# Patient Record
Sex: MALE | Race: WHITE | NOT HISPANIC OR LATINO | Employment: FULL TIME | URBAN - METROPOLITAN AREA
[De-identification: names, ages, dates, MRNs, and addresses within clinical notes are randomized per-mention and may not be internally consistent; named-entity substitution may affect disease eponyms.]

---

## 2017-04-21 ENCOUNTER — TRANSCRIBE ORDERS (OUTPATIENT)
Dept: ADMINISTRATIVE | Facility: HOSPITAL | Age: 52
End: 2017-04-21

## 2017-04-21 ENCOUNTER — OFFICE VISIT (OUTPATIENT)
Dept: CARDIOLOGY | Facility: CLINIC | Age: 52
End: 2017-04-21

## 2017-04-21 ENCOUNTER — LAB (OUTPATIENT)
Dept: LAB | Facility: HOSPITAL | Age: 52
End: 2017-04-21
Attending: INTERNAL MEDICINE

## 2017-04-21 VITALS
WEIGHT: 310 LBS | HEART RATE: 70 BPM | HEIGHT: 66 IN | BODY MASS INDEX: 49.82 KG/M2 | DIASTOLIC BLOOD PRESSURE: 80 MMHG | SYSTOLIC BLOOD PRESSURE: 130 MMHG

## 2017-04-21 DIAGNOSIS — Z01.810 PRE-OPERATIVE CARDIOVASCULAR EXAMINATION: Primary | ICD-10-CM

## 2017-04-21 DIAGNOSIS — I25.10 CORONARY ARTERY DISEASE INVOLVING NATIVE CORONARY ARTERY OF NATIVE HEART WITHOUT ANGINA PECTORIS: Primary | ICD-10-CM

## 2017-04-21 DIAGNOSIS — Z01.810 PRE-OPERATIVE CARDIOVASCULAR EXAMINATION: ICD-10-CM

## 2017-04-21 DIAGNOSIS — Z13.6 SCREENING FOR ISCHEMIC HEART DISEASE: ICD-10-CM

## 2017-04-21 DIAGNOSIS — E11.59 TYPE 2 DIABETES MELLITUS WITH OTHER CIRCULATORY COMPLICATION: ICD-10-CM

## 2017-04-21 LAB
ANION GAP SERPL CALCULATED.3IONS-SCNC: 13.8 MMOL/L
BASOPHILS # BLD AUTO: 0.03 10*3/MM3 (ref 0–0.2)
BASOPHILS NFR BLD AUTO: 0.4 % (ref 0–1.5)
BUN BLD-MCNC: 10 MG/DL (ref 6–20)
BUN/CREAT SERPL: 12.3 (ref 7–25)
CALCIUM SPEC-SCNC: 9 MG/DL (ref 8.6–10.5)
CHLORIDE SERPL-SCNC: 98 MMOL/L (ref 98–107)
CO2 SERPL-SCNC: 23.2 MMOL/L (ref 22–29)
CREAT BLD-MCNC: 0.81 MG/DL (ref 0.76–1.27)
DEPRECATED RDW RBC AUTO: 41.9 FL (ref 37–54)
EOSINOPHIL # BLD AUTO: 0.3 10*3/MM3 (ref 0–0.7)
EOSINOPHIL NFR BLD AUTO: 4.5 % (ref 0.3–6.2)
ERYTHROCYTE [DISTWIDTH] IN BLOOD BY AUTOMATED COUNT: 13.5 % (ref 11.5–14.5)
GFR SERPL CREATININE-BSD FRML MDRD: 100 ML/MIN/1.73
GLUCOSE BLD-MCNC: 247 MG/DL (ref 65–99)
HCT VFR BLD AUTO: 44.6 % (ref 40.4–52.2)
HGB BLD-MCNC: 14.9 G/DL (ref 13.7–17.6)
IMM GRANULOCYTES # BLD: 0 10*3/MM3 (ref 0–0.03)
IMM GRANULOCYTES NFR BLD: 0 % (ref 0–0.5)
LYMPHOCYTES # BLD AUTO: 1.65 10*3/MM3 (ref 0.9–4.8)
LYMPHOCYTES NFR BLD AUTO: 24.7 % (ref 19.6–45.3)
MCH RBC QN AUTO: 28.5 PG (ref 27–32.7)
MCHC RBC AUTO-ENTMCNC: 33.4 G/DL (ref 32.6–36.4)
MCV RBC AUTO: 85.3 FL (ref 79.8–96.2)
MONOCYTES # BLD AUTO: 0.53 10*3/MM3 (ref 0.2–1.2)
MONOCYTES NFR BLD AUTO: 7.9 % (ref 5–12)
NEUTROPHILS # BLD AUTO: 4.16 10*3/MM3 (ref 1.9–8.1)
NEUTROPHILS NFR BLD AUTO: 62.5 % (ref 42.7–76)
PLATELET # BLD AUTO: 219 10*3/MM3 (ref 140–500)
PMV BLD AUTO: 10.9 FL (ref 6–12)
POTASSIUM BLD-SCNC: 3.9 MMOL/L (ref 3.5–5.2)
RBC # BLD AUTO: 5.23 10*6/MM3 (ref 4.6–6)
SODIUM BLD-SCNC: 135 MMOL/L (ref 136–145)
WBC NRBC COR # BLD: 6.67 10*3/MM3 (ref 4.5–10.7)

## 2017-04-21 PROCEDURE — 93000 ELECTROCARDIOGRAM COMPLETE: CPT | Performed by: INTERNAL MEDICINE

## 2017-04-21 PROCEDURE — 99214 OFFICE O/P EST MOD 30 MIN: CPT | Performed by: INTERNAL MEDICINE

## 2017-04-21 PROCEDURE — 36415 COLL VENOUS BLD VENIPUNCTURE: CPT

## 2017-04-21 PROCEDURE — 85025 COMPLETE CBC W/AUTO DIFF WBC: CPT

## 2017-04-21 PROCEDURE — 80048 BASIC METABOLIC PNL TOTAL CA: CPT

## 2017-04-21 RX ORDER — NITROGLYCERIN 0.4 MG/1
TABLET SUBLINGUAL
Qty: 50 TABLET | Refills: 3 | Status: SHIPPED | OUTPATIENT
Start: 2017-04-21 | End: 2017-04-26

## 2017-04-21 RX ORDER — DICLOFENAC SODIUM 75 MG/1
75 TABLET, DELAYED RELEASE ORAL 2 TIMES DAILY
Refills: 0 | COMMUNITY
Start: 2017-03-06 | End: 2020-10-23

## 2017-04-21 NOTE — PROGRESS NOTES
Date of Office Visit: 2017  Encounter Provider: Andrea Martell MD  Place of Service: [facility] CARDIOLOGY  Patient Name: David Henry  :1965    Chief complaint: Coronary artery disease.  Unstable angina.     History of Present Illness:    Dear Dr. Malone:     I again had the pleasure of seeing your patient in the cardiology office on 2017. As  you well know, he is a very pleasant, 52 year-old white male with a past medical history  significant for coronary artery disease and diabetes who presents for follow-up. The  patient first presented to me on 2012, with complaints of left-sided chest  discomfort with radiation to the left neck. He also had been having occasional pain down  his left arm and left arm numbness. He subsequently underwent a left heart  catheterization on 2012, by Dr. Rodriguez. This showed an 80% stenosis of the ostium  of a moderate to large diagonal branch. The branch was at an odd angle, and there was  fear that the stent would compromise flow in the left anterior descending. Therefore, he  was placed on medication at that time.      The patient continued to have significant chest discomfort and ultimately wanted to pursue  percutaneous intervention. He was informed that he would likely need a bifurcation stent  placed to the left anterior descending as well as the diagonal branch. He was willing to  accept these risks and, subsequently, underwent bifurcation stenting of the diagonal and  mid left anterior descending with two PROMUS Element drug-eluting stents on 2013,   by Dr. Blayne Stewart. He did well in the immediate post-procedure period. However, he then  developed recurrent chest discomfort in 10/2013. A follow-up catheterization at that time  showed an 80% stenosis at the ostium of the second diagonal branch. He then underwent  percutaneous intervention with a 3 mm x 10 mm Flextome cutting balloon. This reduced  the  lesion to less than 10%.     The patient presents today for follow-up.  Unfortunately, he has been having several   months worth of chest discomfort.  He describes this as a pressure sensation over his   left precordium which has occurred both with and without exertion.  He does feel that this   is getting worse.  In fact, he had an episode last week which lasted 30-40 minutes before   going away.  He has felt like he has had to stop and rest because of the discomfort   recently.      Past Medical History:   Diagnosis Date   • Allergic reaction to contrast material     IVP dye allergy   • Coronary artery disease    • Diabetes mellitus    • Difficulty breathing     Secondary to Brilinta in the past.   • Hypertension    • Obesity    • TAMMY (obstructive sleep apnea)        Past Surgical History:   Procedure Laterality Date   • APPENDECTOMY         Current Outpatient Prescriptions on File Prior to Visit   Medication Sig Dispense Refill   • aspirin 81 MG tablet Take 81 mg by mouth daily.     • atorvastatin (LIPITOR) 40 MG tablet Take 40 mg by mouth daily.     • Canagliflozin 100 MG tablet Take 1 tablet by mouth daily.     • glimepiride (AMARYL) 4 MG tablet Take 1 tablet by mouth 2 (two) times a day.     • losartan (COZAAR) 50 MG tablet Take 1 tablet (50 mg total) by mouth daily. 30 tablet 6   • sitaGLIPtin (JANUVIA) 100 MG tablet Take 1 tablet by mouth daily.     • [DISCONTINUED] nitroglycerin (NITROSTAT) 0.4 MG SL tablet Place under the tongue.       No current facility-administered medications on file prior to visit.      Allergies as of 04/21/2017 - Max as Reviewed 04/21/2017   Allergen Reaction Noted   • Ticagrelor Shortness Of Breath 02/03/2016   • Iodine  02/03/2016     Social History     Social History   • Marital status: Single     Spouse name: N/A   • Number of children: N/A   • Years of education: N/A     Occupational History   • Not on file.     Social History Main Topics   • Smoking status: Never Smoker  "  • Smokeless tobacco: Never Used   • Alcohol use Yes      Comment: Socially    • Drug use: No   • Sexual activity: Not on file     Other Topics Concern   • Not on file     Social History Narrative     Family History   Problem Relation Age of Onset   • Coronary artery disease Mother      Stenting in her late 60's   • Coronary artery disease Father      Father with fatal MI at age 50.   • Stroke Brother      Brother with CVA in his 40's       Review of Systems   HENT: Positive for headaches.    Cardiovascular: Positive for chest pain.   Neurological: Positive for paresthesias.   All other systems reviewed and are negative.    Objective:     Vitals:    04/21/17 1339   BP: 130/80   Pulse: 70   Weight: (!) 310 lb (141 kg)   Height: 66\" (167.6 cm)     Body mass index is 50.04 kg/(m^2).    Physical Exam   Constitutional: He is oriented to person, place, and time. He appears well-developed and well-nourished.   HENT:   Head: Normocephalic and atraumatic.   Eyes: Conjunctivae are normal.   Neck: Neck supple.   Cardiovascular: Normal rate and regular rhythm.  Exam reveals no gallop and no friction rub.    No murmur heard.  Pulmonary/Chest: Effort normal and breath sounds normal.   Abdominal: Soft. There is no tenderness.   Musculoskeletal: He exhibits no edema.   Neurological: He is alert and oriented to person, place, and time.   Skin: Skin is warm.   Psychiatric: He has a normal mood and affect. His behavior is normal.     Lab Review:     ECG 12 Lead  Date/Time: 4/21/2017 2:59 PM  Performed by: CHEIKH DENG  Authorized by: CHEIKH DENG   Comparison: compared with previous ECG from 2/5/2016  Rhythm: sinus rhythm  Rate: normal  BPM: 70  Other findings: PRWP  Clinical impression: abnormal ECG          Cardiac Procedures:  1. Status post placement of a 3.0 x 12 mm PROMUS drug-eluting stent to the second   diagonal branch, as well as placement of a 4.0 x 16 mm PROMUS drug-eluting stent   to the mid LAD by Dr. Cisneros " Terry on 01/21/2013.   2. Status post cutting balloon angioplasty to the ostium of the second diagonal branch   for an in-stent restenosis in 10/2013.   3. Normal ejection fraction of 60-65% by echocardiogram on 12/11/2012.   4. Exercise Myoview stress test on 02/09/2015: Normal with no ischemia or infarct.        Assessment:       Diagnosis Plan   1. Coronary artery disease involving native coronary artery of native heart without angina pectoris  Case Request Cath Lab: Coronary angiography, Left heart cath, Left ventriculography   2. Type 2 diabetes mellitus with other circulatory complication       Plan:       The patient has been having worsening chest discomfort as described above.  He is diabetic,   and has known coronary artery disease.  I'm concerned that he may be having unstable   angina at this point.  I have recommended a diagnostic left heart catheterization at this time.    I did discuss this in detail with the patient, and he agrees to the plan.  He was given samples   of Ranexa to start taking prior to the catheterization.  He will also continue on aspirin.  He is   not on a beta blocker secondary to relative bradycardia in the past.  He does have an IVP   dye allergy, and will be given premedications for this.  I also advised him to call 911 if he   had severe symptoms and to go to the hospital immediately.  I did also refill his nitroglycerin   Today.    Coronary Artery Disease  Assessment  • The patient has CCS class III - angina with activities of daily living  • The patient is having symptoms consistent with unstable angina     Plan  • The patient is being referred to interventional cardiology  • Lifestyle modifications discussed include adhering to a heart healthy diet, avoidance of tobacco products, maintenance of a healthy weight, medication compliance, regular exercise and regular monitoring of cholesterol and blood pressure    Subjective - Objective  • There has been a previous stent  procedure using ROYER on or around 1/21/2013  • Current antiplatelet therapy includes aspirin 81 mg

## 2017-04-26 RX ORDER — LOSARTAN POTASSIUM 50 MG/1
50 TABLET ORAL DAILY
COMMUNITY
End: 2020-10-23

## 2017-04-26 RX ORDER — NITROGLYCERIN 0.4 MG/1
0.4 TABLET SUBLINGUAL
COMMUNITY

## 2017-04-27 ENCOUNTER — HOSPITAL ENCOUNTER (OUTPATIENT)
Facility: HOSPITAL | Age: 52
Setting detail: HOSPITAL OUTPATIENT SURGERY
Discharge: HOME | End: 2017-04-27
Attending: INTERNAL MEDICINE | Admitting: INTERNAL MEDICINE

## 2017-04-27 VITALS
RESPIRATION RATE: 18 BRPM | SYSTOLIC BLOOD PRESSURE: 154 MMHG | BODY MASS INDEX: 48.97 KG/M2 | OXYGEN SATURATION: 94 % | TEMPERATURE: 99.4 F | DIASTOLIC BLOOD PRESSURE: 82 MMHG | HEART RATE: 77 BPM | HEIGHT: 67 IN | WEIGHT: 312 LBS

## 2017-04-27 DIAGNOSIS — I25.10 CORONARY ARTERY DISEASE INVOLVING NATIVE CORONARY ARTERY OF NATIVE HEART WITHOUT ANGINA PECTORIS: ICD-10-CM

## 2017-04-27 LAB — GLUCOSE BLDC GLUCOMTR-MCNC: 260 MG/DL (ref 70–130)

## 2017-04-27 PROCEDURE — 0 IOPAMIDOL PER 1 ML: Performed by: INTERNAL MEDICINE

## 2017-04-27 PROCEDURE — C1894 INTRO/SHEATH, NON-LASER: HCPCS | Performed by: INTERNAL MEDICINE

## 2017-04-27 PROCEDURE — C1769 GUIDE WIRE: HCPCS | Performed by: INTERNAL MEDICINE

## 2017-04-27 PROCEDURE — 93458 L HRT ARTERY/VENTRICLE ANGIO: CPT | Performed by: INTERNAL MEDICINE

## 2017-04-27 PROCEDURE — 25010000002 FENTANYL CITRATE (PF) 100 MCG/2ML SOLUTION: Performed by: INTERNAL MEDICINE

## 2017-04-27 PROCEDURE — 25010000002 HEPARIN (PORCINE) PER 1000 UNITS: Performed by: INTERNAL MEDICINE

## 2017-04-27 PROCEDURE — 82962 GLUCOSE BLOOD TEST: CPT

## 2017-04-27 PROCEDURE — 25010000002 MIDAZOLAM PER 1 MG: Performed by: INTERNAL MEDICINE

## 2017-04-27 PROCEDURE — C1894 INTRO/SHEATH, NON-LASER: HCPCS

## 2017-04-27 RX ORDER — SODIUM CHLORIDE 9 MG/ML
100 INJECTION, SOLUTION INTRAVENOUS CONTINUOUS
Status: DISCONTINUED | OUTPATIENT
Start: 2017-04-27 | End: 2017-04-27 | Stop reason: HOSPADM

## 2017-04-27 RX ORDER — MIDAZOLAM HYDROCHLORIDE 1 MG/ML
INJECTION INTRAMUSCULAR; INTRAVENOUS AS NEEDED
Status: DISCONTINUED | OUTPATIENT
Start: 2017-04-27 | End: 2017-04-27 | Stop reason: HOSPADM

## 2017-04-27 RX ORDER — RANOLAZINE 500 MG/1
500 TABLET, EXTENDED RELEASE ORAL 2 TIMES DAILY
COMMUNITY
End: 2020-10-23

## 2017-04-27 RX ORDER — SODIUM CHLORIDE 0.9 % (FLUSH) 0.9 %
1-10 SYRINGE (ML) INJECTION AS NEEDED
Status: DISCONTINUED | OUTPATIENT
Start: 2017-04-27 | End: 2017-04-27 | Stop reason: HOSPADM

## 2017-04-27 RX ORDER — LIDOCAINE HYDROCHLORIDE 10 MG/ML
0.1 INJECTION, SOLUTION EPIDURAL; INFILTRATION; INTRACAUDAL; PERINEURAL ONCE AS NEEDED
Status: DISCONTINUED | OUTPATIENT
Start: 2017-04-27 | End: 2017-04-27 | Stop reason: HOSPADM

## 2017-04-27 RX ORDER — SODIUM CHLORIDE 9 MG/ML
125 INJECTION, SOLUTION INTRAVENOUS CONTINUOUS
Status: DISCONTINUED | OUTPATIENT
Start: 2017-04-27 | End: 2017-04-27 | Stop reason: HOSPADM

## 2017-04-27 RX ORDER — LIDOCAINE HYDROCHLORIDE 20 MG/ML
INJECTION, SOLUTION INFILTRATION; PERINEURAL AS NEEDED
Status: DISCONTINUED | OUTPATIENT
Start: 2017-04-27 | End: 2017-04-27 | Stop reason: HOSPADM

## 2017-04-27 RX ORDER — TRAMADOL HYDROCHLORIDE 50 MG/1
50 TABLET ORAL EVERY 6 HOURS PRN
COMMUNITY
End: 2018-06-18

## 2017-04-27 RX ORDER — FENTANYL CITRATE 50 UG/ML
INJECTION, SOLUTION INTRAMUSCULAR; INTRAVENOUS AS NEEDED
Status: DISCONTINUED | OUTPATIENT
Start: 2017-04-27 | End: 2017-04-27 | Stop reason: HOSPADM

## 2017-04-27 RX ADMIN — SODIUM CHLORIDE 125 ML/HR: 9 INJECTION, SOLUTION INTRAVENOUS at 09:45

## 2017-05-08 ENCOUNTER — TELEPHONE (OUTPATIENT)
Dept: CARDIOLOGY | Facility: CLINIC | Age: 52
End: 2017-05-08

## 2017-05-19 ENCOUNTER — OFFICE VISIT (OUTPATIENT)
Dept: CARDIOLOGY | Facility: CLINIC | Age: 52
End: 2017-05-19

## 2017-05-19 VITALS
BODY MASS INDEX: 46.77 KG/M2 | HEART RATE: 84 BPM | WEIGHT: 298 LBS | HEIGHT: 67 IN | SYSTOLIC BLOOD PRESSURE: 142 MMHG | DIASTOLIC BLOOD PRESSURE: 88 MMHG

## 2017-05-19 DIAGNOSIS — R05.9 COUGH: ICD-10-CM

## 2017-05-19 DIAGNOSIS — R06.02 SHORTNESS OF BREATH: ICD-10-CM

## 2017-05-19 DIAGNOSIS — I25.10 CORONARY ARTERY DISEASE INVOLVING NATIVE CORONARY ARTERY OF NATIVE HEART WITHOUT ANGINA PECTORIS: ICD-10-CM

## 2017-05-19 DIAGNOSIS — R07.2 PRECORDIAL PAIN: Primary | ICD-10-CM

## 2017-05-19 PROCEDURE — 99214 OFFICE O/P EST MOD 30 MIN: CPT | Performed by: INTERNAL MEDICINE

## 2017-05-31 ENCOUNTER — HOSPITAL ENCOUNTER (OUTPATIENT)
Dept: CARDIOLOGY | Facility: HOSPITAL | Age: 52
Discharge: HOME | End: 2017-05-31
Attending: INTERNAL MEDICINE | Admitting: INTERNAL MEDICINE

## 2017-05-31 VITALS
HEART RATE: 64 BPM | BODY MASS INDEX: 46.77 KG/M2 | HEIGHT: 67 IN | OXYGEN SATURATION: 93 % | WEIGHT: 298 LBS | DIASTOLIC BLOOD PRESSURE: 82 MMHG | SYSTOLIC BLOOD PRESSURE: 124 MMHG

## 2017-05-31 DIAGNOSIS — R06.02 SHORTNESS OF BREATH: ICD-10-CM

## 2017-05-31 DIAGNOSIS — R05.9 COUGH: ICD-10-CM

## 2017-05-31 DIAGNOSIS — R07.2 PRECORDIAL PAIN: ICD-10-CM

## 2017-05-31 DIAGNOSIS — I25.10 CORONARY ARTERY DISEASE INVOLVING NATIVE CORONARY ARTERY OF NATIVE HEART WITHOUT ANGINA PECTORIS: ICD-10-CM

## 2017-05-31 LAB
BH CV ECHO MEAS - ACS: 2.1 CM
BH CV ECHO MEAS - AO MAX PG (FULL): 5.3 MMHG
BH CV ECHO MEAS - AO MAX PG: 8.5 MMHG
BH CV ECHO MEAS - AO MEAN PG (FULL): 2 MMHG
BH CV ECHO MEAS - AO MEAN PG: 4 MMHG
BH CV ECHO MEAS - AO ROOT AREA: 7.1 CM^2
BH CV ECHO MEAS - AO ROOT DIAM: 3 CM
BH CV ECHO MEAS - AO V2 MAX: 146 CM/SEC
BH CV ECHO MEAS - AO V2 MEAN: 98.9 CM/SEC
BH CV ECHO MEAS - AO V2 VTI: 30 CM
BH CV ECHO MEAS - AVA(I,A): 3.1 CM^2
BH CV ECHO MEAS - AVA(I,D): 3.1 CM^2
BH CV ECHO MEAS - AVA(V,A): 2.8 CM^2
BH CV ECHO MEAS - AVA(V,D): 2.8 CM^2
BH CV ECHO MEAS - CONTRAST EF (2CH): 60 ML/M^2
BH CV ECHO MEAS - CONTRAST EF 4CH: 72 ML/M^2
BH CV ECHO MEAS - EDV(MOD-SP2): 105 ML
BH CV ECHO MEAS - EDV(MOD-SP4): 118 ML
BH CV ECHO MEAS - EDV(TEICH): 162.6 ML
BH CV ECHO MEAS - EF(CUBED): 83 %
BH CV ECHO MEAS - EF(MOD-SP2): 60 %
BH CV ECHO MEAS - EF(MOD-SP4): 72 %
BH CV ECHO MEAS - EF(TEICH): 75.2 %
BH CV ECHO MEAS - ESV(MOD-SP2): 42 ML
BH CV ECHO MEAS - ESV(MOD-SP4): 33 ML
BH CV ECHO MEAS - ESV(TEICH): 40.3 ML
BH CV ECHO MEAS - FS: 44.6 %
BH CV ECHO MEAS - IVS/LVPW: 1.1
BH CV ECHO MEAS - IVSD: 1.2 CM
BH CV ECHO MEAS - LAT PEAK E' VEL: 9 CM/SEC
BH CV ECHO MEAS - LV MASS(C)D: 267.6 GRAMS
BH CV ECHO MEAS - LV MAX PG: 3.2 MMHG
BH CV ECHO MEAS - LV MEAN PG: 2 MMHG
BH CV ECHO MEAS - LV V1 MAX: 89.5 CM/SEC
BH CV ECHO MEAS - LV V1 MEAN: 63.7 CM/SEC
BH CV ECHO MEAS - LV V1 VTI: 20.4 CM
BH CV ECHO MEAS - LVIDD: 5.7 CM
BH CV ECHO MEAS - LVIDS: 3.2 CM
BH CV ECHO MEAS - LVLD AP2: 7.8 CM
BH CV ECHO MEAS - LVLD AP4: 8.2 CM
BH CV ECHO MEAS - LVLS AP2: 7.1 CM
BH CV ECHO MEAS - LVLS AP4: 6.9 CM
BH CV ECHO MEAS - LVOT AREA (M): 4.5 CM^2
BH CV ECHO MEAS - LVOT AREA: 4.5 CM^2
BH CV ECHO MEAS - LVOT DIAM: 2.4 CM
BH CV ECHO MEAS - LVPWD: 1.1 CM
BH CV ECHO MEAS - MED PEAK E' VEL: 8 CM/SEC
BH CV ECHO MEAS - MV A DUR: 0.14 SEC
BH CV ECHO MEAS - MV A MAX VEL: 82.4 CM/SEC
BH CV ECHO MEAS - MV DEC SLOPE: 520 CM/SEC^2
BH CV ECHO MEAS - MV DEC TIME: 0.19 SEC
BH CV ECHO MEAS - MV E MAX VEL: 87.4 CM/SEC
BH CV ECHO MEAS - MV E/A: 1.1
BH CV ECHO MEAS - MV MAX PG: 5 MMHG
BH CV ECHO MEAS - MV MEAN PG: 2 MMHG
BH CV ECHO MEAS - MV P1/2T MAX VEL: 88.4 CM/SEC
BH CV ECHO MEAS - MV P1/2T: 49.8 MSEC
BH CV ECHO MEAS - MV V2 MAX: 112 CM/SEC
BH CV ECHO MEAS - MV V2 MEAN: 58.6 CM/SEC
BH CV ECHO MEAS - MV V2 VTI: 28.4 CM
BH CV ECHO MEAS - MVA P1/2T LCG: 2.5 CM^2
BH CV ECHO MEAS - MVA(P1/2T): 4.4 CM^2
BH CV ECHO MEAS - MVA(VTI): 3.2 CM^2
BH CV ECHO MEAS - PA MAX PG (FULL): 2.2 MMHG
BH CV ECHO MEAS - PA MAX PG: 4.8 MMHG
BH CV ECHO MEAS - PA V2 MAX: 109 CM/SEC
BH CV ECHO MEAS - PULM A REVS DUR: 0.11 SEC
BH CV ECHO MEAS - PULM A REVS VEL: 25.1 CM/SEC
BH CV ECHO MEAS - PULM DIAS VEL: 42 CM/SEC
BH CV ECHO MEAS - PULM S/D: 1.1
BH CV ECHO MEAS - PULM SYS VEL: 46.8 CM/SEC
BH CV ECHO MEAS - PVA(V,A): 3.1 CM^2
BH CV ECHO MEAS - PVA(V,D): 3.1 CM^2
BH CV ECHO MEAS - QP/QS: 0.84
BH CV ECHO MEAS - RAP SYSTOLE: 3 MMHG
BH CV ECHO MEAS - RV MAX PG: 2.6 MMHG
BH CV ECHO MEAS - RV MEAN PG: 2 MMHG
BH CV ECHO MEAS - RV V1 MAX: 80.5 CM/SEC
BH CV ECHO MEAS - RV V1 MEAN: 58.8 CM/SEC
BH CV ECHO MEAS - RV V1 VTI: 18.6 CM
BH CV ECHO MEAS - RVOT AREA: 4.2 CM^2
BH CV ECHO MEAS - RVOT DIAM: 2.3 CM
BH CV ECHO MEAS - SUP REN AO DIAM: 2.2 CM
BH CV ECHO MEAS - SV(AO): 212.1 ML
BH CV ECHO MEAS - SV(CUBED): 157 ML
BH CV ECHO MEAS - SV(LVOT): 92.3 ML
BH CV ECHO MEAS - SV(MOD-SP2): 63 ML
BH CV ECHO MEAS - SV(MOD-SP4): 85 ML
BH CV ECHO MEAS - SV(RVOT): 77.3 ML
BH CV ECHO MEAS - SV(TEICH): 122.3 ML
BH CV XLRA - TDI S': 14 CM/SEC
E/E' RATIO: 10
LEFT ATRIUM VOLUME INDEX: 23 ML/M2
SINUS: 2.8 CM
STJ: 2.8 CM

## 2017-05-31 PROCEDURE — 93306 TTE W/DOPPLER COMPLETE: CPT | Performed by: INTERNAL MEDICINE

## 2017-05-31 PROCEDURE — 25010000002 PERFLUTREN (DEFINITY) 8.476 MG IN SODIUM CHLORIDE 10 ML INJECTION: Performed by: INTERNAL MEDICINE

## 2017-05-31 PROCEDURE — C8929 TTE W OR WO FOL WCON,DOPPLER: HCPCS

## 2017-05-31 RX ADMIN — PERFLUTREN 2 ML: 6.52 INJECTION, SUSPENSION INTRAVENOUS at 09:21

## 2018-06-18 ENCOUNTER — OFFICE VISIT (OUTPATIENT)
Dept: CARDIOLOGY | Facility: CLINIC | Age: 53
End: 2018-06-18

## 2018-06-18 VITALS
HEIGHT: 67 IN | SYSTOLIC BLOOD PRESSURE: 110 MMHG | DIASTOLIC BLOOD PRESSURE: 78 MMHG | HEART RATE: 75 BPM | BODY MASS INDEX: 45.92 KG/M2 | WEIGHT: 292.6 LBS | OXYGEN SATURATION: 95 %

## 2018-06-18 DIAGNOSIS — I25.10 CORONARY ARTERY DISEASE INVOLVING NATIVE CORONARY ARTERY OF NATIVE HEART WITHOUT ANGINA PECTORIS: ICD-10-CM

## 2018-06-18 DIAGNOSIS — R07.2 PRECORDIAL PAIN: Primary | ICD-10-CM

## 2018-06-18 PROCEDURE — 99214 OFFICE O/P EST MOD 30 MIN: CPT | Performed by: INTERNAL MEDICINE

## 2018-06-19 NOTE — PROGRESS NOTES
Date of Office Visit: 2018  Encounter Provider: Andrea Martell MD  Place of Service: [facility] CARDIOLOGY  Patient Name: David Henry  :1965    Chief complaint: Follow-up for coronary artery disease.  Recurrent chest pain.    History of Present Illness:    Dear Dr. Malone:    I again had the pleasure of seeing your patient in the cardiology office on 2018.  As you   well know, he is a very pleasant, 53 year-old white male with a past medical history significant   for coronary artery disease and diabetes who presents for follow-up. The patient first   presented to me on 2012, with complaints of left-sided chest discomfort with radiation   to the left neck. He also had been having occasional pain down his left arm and left arm   numbness. He subsequently underwent a left heart catheterization on 2012, by Dr. Rodriguez. This showed an 80% stenosis of the ostium of a moderate to large diagonal branch.   The branch was at an odd angle, and there was fear that the stent would compromise flow   in the left anterior descending. Therefore, he was placed on medication at that time.     The patient continued to have significant chest discomfort and ultimately wanted to pursue  percutaneous intervention. He was informed that he would likely need a bifurcation stent  placed to the left anterior descending as well as the diagonal branch. He was willing to  accept these risks and, subsequently, underwent bifurcation stenting of the diagonal and  mid left anterior descending with two PROMUS Element drug-eluting stents on 2013,   by Dr. Blayne Stewart. He did well in the immediate post-procedure period. However, he then  developed recurrent chest discomfort in 10/2013. A follow-up catheterization at that time  showed an 80% stenosis at the ostium of the second diagonal branch. He then underwent  percutaneous intervention with a 3 mm x 10 mm Flextome cutting  "balloon. This reduced the  lesion to less than 10%.    At the patient's follow-up visit on 4/21/2017, he was complaining of several months of   worsening chest discomfort.  He had described a pressure sensation over his left   precordium which seem to be lasting longer.  There was concern that he may be having   unstable angina.  He subsequently underwent a left heart catheterization on 4/27/2017 by   Dr. Welch.  This showed the complex bifurcation stent in the LAD and second diagonal   branch to be patent.  His left circumflex and right coronary arteries were normal.  He did   have a 40% ostial stenosis and a smaller first diagonal branch.    The patient presents today for follow-up.  Unfortunately, he has been having recurrent chest   discomfort.  He describes this as left sided pain which feels like a pressure sensation.  It is   focal over the left breast area, and he often feels as if he has to \"grab his chest\".  This is   similar to what he had last year, although it is occurring more frequently now.  He states   that it occurs randomly and last for several minutes at a time.  He does state that it is fairly   intense.    Past Medical History:   Diagnosis Date   • Allergic reaction to contrast material     IVP dye allergy   • Coronary artery disease    • Diabetes mellitus    • Difficulty breathing     Secondary to Brilinta in the past.   • Heart murmur     PER PT   • Hyperlipidemia    • Hypertension    • Obesity    • TAMMY (obstructive sleep apnea)        Past Surgical History:   Procedure Laterality Date   • APPENDECTOMY     • CARDIAC CATHETERIZATION N/A 4/27/2017    Procedure: Coronary angiography;  Surgeon: Reji Welhc MD;  Location: [facility] INVASIVE LOCATION;  Service:    • CARDIAC CATHETERIZATION N/A 4/27/2017    Procedure: Left heart cath;  Surgeon: Reji Welch MD;  Location: [facility] INVASIVE LOCATION;  Service:    • CARDIAC CATHETERIZATION N/A 4/27/2017    Procedure: Left " ventriculography;  Surgeon: Reji Welch MD;  Location: [facility] INVASIVE LOCATION;  Service:    • FACIAL RECONSTRUCTION SURGERY      FOREHEAD       Current Outpatient Prescriptions on File Prior to Visit   Medication Sig Dispense Refill   • aspirin 81 MG tablet Take 81 mg by mouth daily.     • atorvastatin (LIPITOR) 40 MG tablet Take 40 mg by mouth daily.     • diclofenac (VOLTAREN) 75 MG EC tablet Take 75 mg by mouth 2 (Two) Times a Day.  0   • glimepiride (AMARYL) 4 MG tablet Take 4 mg by mouth 2 (Two) Times a Day.     • losartan (COZAAR) 50 MG tablet Take 50 mg by mouth Daily.     • nitroglycerin (NITROSTAT) 0.4 MG SL tablet Place 0.4 mg under the tongue Every 5 (Five) Minutes As Needed for Chest Pain. Take no more than 3 doses in 15 minutes.     • sitaGLIPtin (JANUVIA) 100 MG tablet Take 100 mg by mouth Daily.     • ranolazine (RANEXA) 500 MG 12 hr tablet Take 500 mg by mouth 2 (Two) Times a Day.       No current facility-administered medications on file prior to visit.      Allergies as of 06/18/2018 - Reviewed 06/18/2018   Allergen Reaction Noted   • Ticagrelor Shortness Of Breath 02/03/2016   • Iodine  02/03/2016     Social History     Social History   • Marital status:      Spouse name: N/A   • Number of children: N/A   • Years of education: N/A     Occupational History   • Not on file.     Social History Main Topics   • Smoking status: Never Smoker   • Smokeless tobacco: Never Used   • Alcohol use Yes      Comment: Socially    • Drug use: No   • Sexual activity: Defer     Other Topics Concern   • Not on file     Social History Narrative   • No narrative on file     Family History   Problem Relation Age of Onset   • Coronary artery disease Mother         Stenting in her late 60's   • Coronary artery disease Father         Father with fatal MI at age 50.   • Stroke Brother         Brother with CVA in his 40's       Review of Systems   Constitution: Positive for malaise/fatigue.   Cardiovascular:  "Positive for chest pain and palpitations.   Neurological: Positive for light-headedness.   All other systems reviewed and are negative.     Objective:     Vitals:    06/18/18 1430   BP: 110/78   Pulse: 75   SpO2: 95%   Weight: 133 kg (292 lb 9.6 oz)   Height: 170.2 cm (67.01\")     Body mass index is 45.82 kg/m².    Physical Exam   Constitutional: He is oriented to person, place, and time. He appears well-developed and well-nourished.   HENT:   Head: Normocephalic and atraumatic.   Eyes: Conjunctivae are normal.   Neck: Neck supple.   Cardiovascular: Normal rate and regular rhythm.  Exam reveals no gallop and no friction rub.    No murmur heard.  Pulmonary/Chest: Effort normal and breath sounds normal.   Abdominal: Soft. There is no tenderness.   Musculoskeletal: He exhibits no edema.   Neurological: He is alert and oriented to person, place, and time.   Skin: Skin is warm.   Psychiatric: He has a normal mood and affect. His behavior is normal.     Lab Review:   Procedures    Cardiac Procedures:  1. Status post placement of a 3.0 x 12 mm PROMUS drug-eluting stent to the second   diagonal branch, as well as placement of a 4.0 x 16 mm PROMUS drug-eluting stent to   the mid LAD by Dr. Blayne Stewart on 01/21/2013.   2. Status post cutting balloon angioplasty to the ostium of the second diagonal branch for   an in-stent restenosis in 10/2013.   3. Normal ejection fraction of 60-65% by echocardiogram on 12/11/2012.   4. Exercise Myoview stress test on 02/09/2015: Normal with no ischemia or infarct.   5.  Left heart catheterization on 4/27/2017 by Dr. Welch: The left main was normal.  The   mid LAD had a complex bifurcation stent that was widely patent into the second   diagonal branch.  The first diagonal branch was a smaller vessel with a 40% ostial   stenosis.  The left circumflex was normal.  The right coronary artery was dominant and   normal.  6.  Echocardiogram on 5/31/2017: The ejection fraction was 60-65%.  There " was grade 2   diastolic dysfunction.  The right ventricle was mildly dilated with normal function.  Left   atrium was mildly dilated.  There was no significant valvular disease.    Assessment:       Diagnosis Plan   1. Precordial pain  CT Angiogram Chest With & Without Contrast   2. Coronary artery disease involving native coronary artery of native heart without angina pectoris       Plan:       Again, the patient is now having significant recurrent chest discomfort which he describes   a pressure sensation over his left precordial area.  This is similar to what he had in April 2017, but dissimilar from his previous angina. His previous angina was more like a stabbing   sensation in the center of his chest.  His catheterization on 4/27/2017 showed only a 40%   lesion in the ostial first diagonal branch.  His stents in the LAD and second diagonal branch   were widely patent.  He did have an echocardiogram last year's well which showed grade 2   diastolic dysfunction, but no other significant issues.  At this point, I would be more   concerned about other potential causes of chest discomfort such as an aortic aneurysm or   other aortic pathology.  I have recommended checking a CT angiogram of his chest to   assess his aorta further.  He does state that this is fairly intense discomfort when it occurs.    He will continue on the aspirin and Lipitor for the coronary artery disease.  His blood   pressure is controlled on the losartan.  He is not on a beta blocker secondary to bradycardia   in the past with these agents.  Further plans will be made pending the results of the CT   angiogram of the chest.      Coronary Artery Disease  Assessment  • The patient has no angina    Plan  • Lifestyle modifications discussed include adhering to a heart healthy diet, avoidance of tobacco products, maintenance of a healthy weight, medication compliance, regular exercise and regular monitoring of cholesterol and blood  pressure    Subjective - Objective  • There has been a previous stent procedure using ROYER on or around 1/21/2013  • Current antiplatelet therapy includes aspirin 81 mg

## 2020-10-23 ENCOUNTER — OFFICE VISIT (OUTPATIENT)
Dept: CARDIOLOGY | Facility: CLINIC | Age: 55
End: 2020-10-23

## 2020-10-23 VITALS
DIASTOLIC BLOOD PRESSURE: 82 MMHG | WEIGHT: 278 LBS | HEART RATE: 81 BPM | SYSTOLIC BLOOD PRESSURE: 114 MMHG | BODY MASS INDEX: 43.63 KG/M2 | HEIGHT: 67 IN

## 2020-10-23 DIAGNOSIS — R07.89 CHEST PRESSURE: Primary | ICD-10-CM

## 2020-10-23 DIAGNOSIS — I10 ESSENTIAL HYPERTENSION: ICD-10-CM

## 2020-10-23 DIAGNOSIS — I25.10 CORONARY ARTERY DISEASE INVOLVING NATIVE CORONARY ARTERY OF NATIVE HEART, ANGINA PRESENCE UNSPECIFIED: ICD-10-CM

## 2020-10-23 PROCEDURE — 93000 ELECTROCARDIOGRAM COMPLETE: CPT | Performed by: INTERNAL MEDICINE

## 2020-10-23 PROCEDURE — 99214 OFFICE O/P EST MOD 30 MIN: CPT | Performed by: INTERNAL MEDICINE

## 2020-10-23 RX ORDER — EMPAGLIFLOZIN 25 MG/1
TABLET, FILM COATED ORAL DAILY
COMMUNITY
Start: 2020-09-18 | End: 2022-12-14

## 2020-10-23 RX ORDER — LISINOPRIL 20 MG/1
20 TABLET ORAL DAILY
Qty: 30 TABLET | Refills: 11 | Status: SHIPPED | OUTPATIENT
Start: 2020-10-23

## 2020-10-23 RX ORDER — LISINOPRIL 10 MG/1
TABLET ORAL
COMMUNITY
Start: 2020-10-07 | End: 2020-10-23

## 2020-10-23 RX ORDER — ATORVASTATIN CALCIUM 40 MG/1
40 TABLET, FILM COATED ORAL DAILY
Qty: 30 TABLET | Refills: 11 | Status: SHIPPED | OUTPATIENT
Start: 2020-10-23

## 2020-10-28 ENCOUNTER — RESULTS ENCOUNTER (OUTPATIENT)
Dept: CARDIOLOGY | Facility: CLINIC | Age: 55
End: 2020-10-28

## 2020-10-28 DIAGNOSIS — I25.10 CORONARY ARTERY DISEASE INVOLVING NATIVE CORONARY ARTERY OF NATIVE HEART, ANGINA PRESENCE UNSPECIFIED: ICD-10-CM

## 2020-10-28 DIAGNOSIS — I10 ESSENTIAL HYPERTENSION: ICD-10-CM

## 2020-10-29 ENCOUNTER — LAB (OUTPATIENT)
Dept: LAB | Facility: HOSPITAL | Age: 55
End: 2020-10-29

## 2020-10-29 ENCOUNTER — HOSPITAL ENCOUNTER (OUTPATIENT)
Dept: CARDIOLOGY | Facility: HOSPITAL | Age: 55
Discharge: HOME | End: 2020-10-29

## 2020-10-29 VITALS — WEIGHT: 278 LBS | HEIGHT: 67 IN | BODY MASS INDEX: 43.63 KG/M2

## 2020-10-29 DIAGNOSIS — I25.10 CORONARY ARTERY DISEASE INVOLVING NATIVE CORONARY ARTERY OF NATIVE HEART, ANGINA PRESENCE UNSPECIFIED: ICD-10-CM

## 2020-10-29 DIAGNOSIS — R07.89 CHEST PRESSURE: ICD-10-CM

## 2020-10-29 LAB
ANION GAP SERPL CALCULATED.3IONS-SCNC: 9.4 MMOL/L (ref 5–15)
BH CV NUCLEAR PRIOR STUDY: 2
BH CV STRESS BP STAGE 1: NORMAL
BH CV STRESS COMMENTS STAGE 1: NORMAL
BH CV STRESS DOSE REGADENOSON STAGE 1: 0.4
BH CV STRESS DURATION MIN STAGE 1: 0
BH CV STRESS DURATION SEC STAGE 1: 10
BH CV STRESS HR STAGE 1: 94
BH CV STRESS PROTOCOL 1: NORMAL
BH CV STRESS RECOVERY BP: NORMAL MMHG
BH CV STRESS RECOVERY HR: 83 BPM
BH CV STRESS STAGE 1: 1
BUN SERPL-MCNC: 11 MG/DL (ref 6–20)
BUN/CREAT SERPL: 14.7 (ref 7–25)
CALCIUM SPEC-SCNC: 9.4 MG/DL (ref 8.6–10.5)
CHLORIDE SERPL-SCNC: 102 MMOL/L (ref 98–107)
CO2 SERPL-SCNC: 27.6 MMOL/L (ref 22–29)
CREAT SERPL-MCNC: 0.75 MG/DL (ref 0.76–1.27)
GFR SERPL CREATININE-BSD FRML MDRD: 109 ML/MIN/1.73
GLUCOSE SERPL-MCNC: 140 MG/DL (ref 65–99)
LV EF NUC BP: 58 %
MAXIMAL PREDICTED HEART RATE: 166 BPM
PERCENT MAX PREDICTED HR: 56.63 %
POTASSIUM SERPL-SCNC: 4.2 MMOL/L (ref 3.5–5.2)
SODIUM SERPL-SCNC: 139 MMOL/L (ref 136–145)
STRESS BASELINE BP: NORMAL MMHG
STRESS BASELINE HR: 75 BPM
STRESS PERCENT HR: 67 %
STRESS POST EXERCISE DUR SEC: 10 SEC
STRESS POST PEAK BP: NORMAL MMHG
STRESS POST PEAK HR: 94 BPM
STRESS TARGET HR: 141 BPM

## 2020-10-29 PROCEDURE — 93018 CV STRESS TEST I&R ONLY: CPT | Performed by: INTERNAL MEDICINE

## 2020-10-29 PROCEDURE — 78492 MYOCRD IMG PET MLT RST&STRS: CPT

## 2020-10-29 PROCEDURE — 80048 BASIC METABOLIC PNL TOTAL CA: CPT | Performed by: INTERNAL MEDICINE

## 2020-10-29 PROCEDURE — A9555 RB82 RUBIDIUM: HCPCS | Performed by: INTERNAL MEDICINE

## 2020-10-29 PROCEDURE — 93017 CV STRESS TEST TRACING ONLY: CPT

## 2020-10-29 PROCEDURE — 25010000002 REGADENOSON 0.4 MG/5ML SOLUTION: Performed by: INTERNAL MEDICINE

## 2020-10-29 PROCEDURE — 36415 COLL VENOUS BLD VENIPUNCTURE: CPT | Performed by: INTERNAL MEDICINE

## 2020-10-29 PROCEDURE — 93016 CV STRESS TEST SUPVJ ONLY: CPT | Performed by: INTERNAL MEDICINE

## 2020-10-29 PROCEDURE — 0 RUBIDIUM CHLORIDE: Performed by: INTERNAL MEDICINE

## 2020-10-29 PROCEDURE — 78492 MYOCRD IMG PET MLT RST&STRS: CPT | Performed by: INTERNAL MEDICINE

## 2020-10-29 RX ADMIN — REGADENOSON 0.4 MG: 0.08 INJECTION, SOLUTION INTRAVENOUS at 09:56

## 2020-11-02 NOTE — PROGRESS NOTES
Date of Office Visit: 10/23/2020  Encounter Provider: Andrea Martell MD  Place of Service: [facility] CARDIOLOGY  Patient Name: David Henry  :1965    Chief complaint: Follow-up for coronary artery disease, hypertension.    History of Present Illness:    Dear Dr. Malone:    I again had the pleasure of seeing your patient in the cardiology office on 10/23/2020.  As you   well know, he is a very pleasant, 55 year-old white male with a past medical history significant   for coronary artery disease and diabetes who presents for follow-up. The patient first   presented to me on 2012, with complaints of left-sided chest discomfort with radiation   to the left neck. He also had been having occasional pain down his left arm and left arm   numbness. He subsequently underwent a left heart catheterization on 2012, by Dr. Rodriguez. This showed an 80% stenosis of the ostium of a moderate to large diagonal branch.   The branch was at an odd angle, and there was fear that the stent would compromise flow   in the left anterior descending. Therefore, he was placed on medication at that time.     The patient continued to have significant chest discomfort and ultimately wanted to pursue  percutaneous intervention. He was informed that he would likely need a bifurcation stent  placed to the left anterior descending as well as the diagonal branch. He was willing to  accept these risks and, subsequently, underwent bifurcation stenting of the diagonal and  mid left anterior descending with two PROMUS Element drug-eluting stents on 2013,   by Dr. Blayne Stewart. He did well in the immediate post-procedure period. However, he then  developed recurrent chest discomfort in 10/2013. A follow-up catheterization at that time  showed an 80% stenosis at the ostium of the second diagonal branch. He then underwent  percutaneous intervention with a 3 mm x 10 mm Flextome cutting balloon. This  reduced the  lesion to less than 10%.    At the patient's follow-up visit on 2017, he was complaining of several months of   worsening chest discomfort.  He had described a pressure sensation over his left   precordium which seem to be lasting longer.  There was concern that he may be having   unstable angina.  He subsequently underwent a left heart catheterization on 2017 by   Dr. Welch.  This showed the complex bifurcation stent in the LAD and second diagonal   branch to be patent.  His left circumflex and right coronary arteries were normal.  He did   have a 40% ostial stenosis and a smaller first diagonal branch.    The patient presents today for follow-up.  He has had some recurrent chest discomfort which she describes as pressure over his left precordium.  It occurs randomly, and lasts for 30 minutes to 1 hour at a time.  His blood pressure is high today, and it has likely been running high.  He also has been off of his Lipitor for unclear reasons.  He thinks that it just  and he never renewed it.    Past Medical History:   Diagnosis Date   • Allergic reaction to contrast material     IVP dye allergy   • Coronary artery disease    • Diabetes mellitus (CMS/HCC)    • Difficulty breathing     Secondary to Brilinta in the past.   • Heart murmur     PER PT   • Hyperlipidemia    • Hypertension    • Obesity    • TAMMY (obstructive sleep apnea)        Past Surgical History:   Procedure Laterality Date   • APPENDECTOMY     • CARDIAC CATHETERIZATION N/A 2017    Procedure: Coronary angiography;  Surgeon: Reji Welch MD;  Location: [facility] INVASIVE LOCATION;  Service:    • CARDIAC CATHETERIZATION N/A 2017    Procedure: Left heart cath;  Surgeon: Reji Welch MD;  Location: [facility] CATH INVASIVE LOCATION;  Service:    • CARDIAC CATHETERIZATION N/A 2017    Procedure: Left ventriculography;  Surgeon: Reji Welch MD;  Location: [facility] CATH INVASIVE LOCATION;  Service:    • FACIAL  "RECONSTRUCTION SURGERY      FOREHEAD       Current Outpatient Medications on File Prior to Visit   Medication Sig Dispense Refill   • aspirin 81 MG tablet Take 81 mg by mouth daily.     • glimepiride (AMARYL) 4 MG tablet Take 4 mg by mouth 2 (Two) Times a Day.     • Jardiance 25 MG tablet Take  by mouth Daily.     • nitroglycerin (NITROSTAT) 0.4 MG SL tablet Place 0.4 mg under the tongue Every 5 (Five) Minutes As Needed for Chest Pain. Take no more than 3 doses in 15 minutes.     • sitaGLIPtin (JANUVIA) 100 MG tablet Take 100 mg by mouth Daily.       No current facility-administered medications on file prior to visit.      Allergies as of 10/23/2020 - Reviewed 06/19/2018   Allergen Reaction Noted   • Ticagrelor Shortness Of Breath 02/03/2016   • Iodine  02/03/2016     Social History     Socioeconomic History   • Marital status:      Spouse name: Not on file   • Number of children: Not on file   • Years of education: Not on file   • Highest education level: Not on file   Tobacco Use   • Smoking status: Never Smoker   • Smokeless tobacco: Never Used   Substance and Sexual Activity   • Alcohol use: Yes     Comment: Socially    • Drug use: No   • Sexual activity: Defer     Family History   Problem Relation Age of Onset   • Coronary artery disease Mother         Stenting in her late 60's   • Coronary artery disease Father         Father with fatal MI at age 50.   • Stroke Brother         Brother with CVA in his 40's       Review of Systems   Constitution: Positive for malaise/fatigue.   Cardiovascular: Positive for chest pain and dyspnea on exertion.   All other systems reviewed and are negative.     Objective:     Vitals:    10/23/20 1030   BP: 114/82   Pulse: 81   Weight: 126 kg (278 lb)   Height: 170.2 cm (67\")     Body mass index is 43.54 kg/m².    Physical Exam   Constitutional: He is oriented to person, place, and time. He appears well-developed and well-nourished.   HENT:   Head: Normocephalic and " atraumatic.   Eyes: Conjunctivae are normal.   Neck: Neck supple.   Cardiovascular: Normal rate and regular rhythm. Exam reveals no gallop and no friction rub.   No murmur heard.  Pulmonary/Chest: Effort normal and breath sounds normal.   Abdominal: Soft. There is no abdominal tenderness.   Musculoskeletal:         General: No edema.   Neurological: He is alert and oriented to person, place, and time.   Skin: Skin is warm.   Psychiatric: He has a normal mood and affect. His behavior is normal.     Lab Review:     ECG 12 Lead    Date/Time: 10/23/2020 9:36 AM  Performed by: Andrea Martell MD  Authorized by: Adnrea Martell MD   Comparison: compared with previous ECG from 4/21/2017  Similar to previous ECG  Rhythm: sinus rhythm  Rate: normal  BPM: 81  Other findings: poor R wave progression    Clinical impression: abnormal EKG            Cardiac Procedures:  1. Status post placement of a 3.0 x 12 mm PROMUS drug-eluting stent to the second   diagonal branch, as well as placement of a 4.0 x 16 mm PROMUS drug-eluting stent to   the mid LAD by Dr. Blayne Stewart on 01/21/2013.   2. Status post cutting balloon angioplasty to the ostium of the second diagonal branch for   an in-stent restenosis in 10/2013.   3. Normal ejection fraction of 60-65% by echocardiogram on 12/11/2012.   4. Exercise Myoview stress test on 02/09/2015: Normal with no ischemia or infarct.   5.  Left heart catheterization on 4/27/2017 by Dr. Welch: The left main was normal.  The   mid LAD had a complex bifurcation stent that was widely patent into the second   diagonal branch.  The first diagonal branch was a smaller vessel with a 40% ostial   stenosis.  The left circumflex was normal.  The right coronary artery was dominant and   normal.  6.  Echocardiogram on 5/31/2017: The ejection fraction was 60-65%.  There was grade 2   diastolic dysfunction.  The right ventricle was mildly dilated with normal function.  Left   atrium was mildly dilated.   There was no significant valvular disease.    Assessment:       Diagnosis Plan   1. Chest pressure  Stress Test With Pet Myocardial Perfusion (MULTI STUDY, REST AND STRESS)   2. Coronary artery disease involving native coronary artery of native heart, angina presence unspecified  Basic Metabolic Panel    Stress Test With Pet Myocardial Perfusion (MULTI STUDY, REST AND STRESS)   3. Essential hypertension  Basic Metabolic Panel     Plan:       Overall, there are some issues that need to be addressed today.  He has had some random chest discomfort as described above.  Given his history and his diabetes, I am going to check a Lexiscan PET stress test.  I also feel his blood pressure is running too high.  I am going to increase his lisinopril from 10 mg/day to 20 mg/day.  I will recheck a BMP in 2 weeks.  He needs to be back on his statin medication, and I restarted the Lipitor at 40 mg/day.  He will continue on the aspirin for life.  I will get him back on a 6-month schedule for follow-up.

## 2021-10-27 ENCOUNTER — OFFICE VISIT (OUTPATIENT)
Dept: CARDIOLOGY | Facility: CLINIC | Age: 56
End: 2021-10-27

## 2021-10-27 VITALS
SYSTOLIC BLOOD PRESSURE: 150 MMHG | BODY MASS INDEX: 46.83 KG/M2 | OXYGEN SATURATION: 98 % | HEIGHT: 67 IN | HEART RATE: 75 BPM | WEIGHT: 298.4 LBS | DIASTOLIC BLOOD PRESSURE: 80 MMHG | RESPIRATION RATE: 18 BRPM

## 2021-10-27 DIAGNOSIS — R07.9 CHEST PAIN, UNSPECIFIED TYPE: ICD-10-CM

## 2021-10-27 DIAGNOSIS — I25.10 CORONARY ARTERY DISEASE INVOLVING NATIVE CORONARY ARTERY OF NATIVE HEART, UNSPECIFIED WHETHER ANGINA PRESENT: Primary | ICD-10-CM

## 2021-10-27 DIAGNOSIS — I10 PRIMARY HYPERTENSION: ICD-10-CM

## 2021-10-27 PROCEDURE — 93000 ELECTROCARDIOGRAM COMPLETE: CPT | Performed by: INTERNAL MEDICINE

## 2021-10-27 PROCEDURE — 99214 OFFICE O/P EST MOD 30 MIN: CPT | Performed by: INTERNAL MEDICINE

## 2021-10-27 RX ORDER — CARVEDILOL 3.12 MG/1
3.12 TABLET ORAL 2 TIMES DAILY WITH MEALS
Qty: 60 TABLET | Refills: 4 | Status: SHIPPED | OUTPATIENT
Start: 2021-10-27 | End: 2021-11-23

## 2021-10-27 RX ORDER — CARVEDILOL 3.12 MG/1
3.12 TABLET ORAL 2 TIMES DAILY WITH MEALS
COMMUNITY
End: 2021-10-27 | Stop reason: SDUPTHER

## 2021-10-27 NOTE — TELEPHONE ENCOUNTER
Pt in office today  Per Dr Martell he is starting pt on Coreg 3.125mg BID.   He verbally asked if I would send new rx in to pt's pharmacy.   Goran VACA

## 2021-11-21 RX ORDER — PREDNISONE 20 MG/1
TABLET ORAL
Qty: 3 TABLET | Refills: 0 | Status: SHIPPED | OUTPATIENT
Start: 2021-11-21 | End: 2022-12-14

## 2021-11-21 RX ORDER — FAMOTIDINE 20 MG/1
TABLET, FILM COATED ORAL
Qty: 3 TABLET | Refills: 0 | Status: SHIPPED | OUTPATIENT
Start: 2021-11-21 | End: 2022-12-14

## 2021-11-22 NOTE — PROGRESS NOTES
Date of Office Visit: 10/27/2021  Encounter Provider: Andrea Martell MD  Place of Service: [facility] CARDIOLOGY  Patient Name: David Henry  :1965    Chief complaint: Follow-up for coronary artery disease, hypertension.    History of Present Illness:    Dear Dr. Malone:    I again had the pleasure of seeing your patient in the cardiology office on 10/27/2021.  As you   well know, he is a very pleasant, 56 year-old white male with a past medical history significant   for coronary artery disease and diabetes who presents for follow-up. The patient first   presented to me on 2012, with complaints of left-sided chest discomfort with radiation   to the left neck. He also had been having occasional pain down his left arm and left arm   numbness. He subsequently underwent a left heart catheterization on 2012, by Dr. Rodriguez. This showed an 80% stenosis of the ostium of a moderate to large diagonal branch.   The branch was at an odd angle, and there was fear that the stent would compromise flow   in the left anterior descending. Therefore, he was placed on medication at that time.     The patient continued to have significant chest discomfort and ultimately wanted to pursue  percutaneous intervention. He was informed that he would likely need a bifurcation stent  placed to the left anterior descending as well as the diagonal branch. He was willing to  accept these risks and, subsequently, underwent bifurcation stenting of the diagonal and  mid left anterior descending with two PROMUS Element drug-eluting stents on 2013,   by Dr. Blayne Stewart. He did well in the immediate post-procedure period. However, he then  developed recurrent chest discomfort in 10/2013. A follow-up catheterization at that time  showed an 80% stenosis at the ostium of the second diagonal branch. He then underwent  percutaneous intervention with a 3 mm x 10 mm Flextome cutting balloon. This  reduced the  lesion to less than 10%.    At the patient's follow-up visit on 4/21/2017, he was complaining of several months of   worsening chest discomfort.  He had described a pressure sensation over his left   precordium which seem to be lasting longer.  There was concern that he may be having   unstable angina.  He subsequently underwent a left heart catheterization on 4/27/2017 by   Dr. Welch.  This showed the complex bifurcation stent in the LAD and second diagonal   branch to be patent.  His left circumflex and right coronary arteries were normal.  He did   have a 40% ostial stenosis and a smaller first diagonal branch.    The patient presents today for follow-up.  He has been having sharp pains again in his chest,   mainly at the left sternal border.  These are fairly random in nature, and typically last for   several minutes.  He has had some with exertion, although these are not exclusively with   exertion.  His EKG from today still shows poor R wave progression, but is unchanged.  His   blood pressure is also elevated.  He is not on his carvedilol for unclear reasons.    Past Medical History:   Diagnosis Date   • Allergic reaction to contrast material     IVP dye allergy   • Coronary artery disease    • Diabetes mellitus (HCC)    • Difficulty breathing     Secondary to Brilinta in the past.   • Heart murmur     PER PT   • Hyperlipidemia    • Hypertension    • Obesity    • TAMMY (obstructive sleep apnea)        Past Surgical History:   Procedure Laterality Date   • APPENDECTOMY     • CARDIAC CATHETERIZATION N/A 4/27/2017    Procedure: Coronary angiography;  Surgeon: Reji Welch MD;  Location: [facility] INVASIVE LOCATION;  Service:    • CARDIAC CATHETERIZATION N/A 4/27/2017    Procedure: Left heart cath;  Surgeon: Reji Welch MD;  Location: [facility] CATH INVASIVE LOCATION;  Service:    • CARDIAC CATHETERIZATION N/A 4/27/2017    Procedure: Left ventriculography;  Surgeon: Reji Welch MD;   "Location: [facility] INVASIVE LOCATION;  Service:    • FACIAL RECONSTRUCTION SURGERY      FOREHEAD       Current Outpatient Medications on File Prior to Visit   Medication Sig Dispense Refill   • aspirin 81 MG tablet Take 81 mg by mouth daily.     • atorvastatin (LIPITOR) 40 MG tablet Take 1 tablet by mouth Daily. 30 tablet 11   • glimepiride (AMARYL) 4 MG tablet Take 4 mg by mouth 2 (Two) Times a Day.     • Jardiance 25 MG tablet Take  by mouth Daily.     • lisinopril (PRINIVIL,ZESTRIL) 20 MG tablet Take 1 tablet by mouth Daily. 30 tablet 11   • nitroglycerin (NITROSTAT) 0.4 MG SL tablet Place 0.4 mg under the tongue Every 5 (Five) Minutes As Needed for Chest Pain. Take no more than 3 doses in 15 minutes.     • sitaGLIPtin (JANUVIA) 100 MG tablet Take 100 mg by mouth Daily.       No current facility-administered medications on file prior to visit.     Allergies as of 10/27/2021 - Reviewed 10/27/2021   Allergen Reaction Noted   • Ticagrelor Shortness Of Breath 02/03/2016   • Iodine  02/03/2016     Social History     Socioeconomic History   • Marital status:    Tobacco Use   • Smoking status: Never Smoker   • Smokeless tobacco: Never Used   Substance and Sexual Activity   • Alcohol use: Yes     Comment: Socially    • Drug use: No   • Sexual activity: Defer     Family History   Problem Relation Age of Onset   • Coronary artery disease Mother         Stenting in her late 60's   • Coronary artery disease Father         Father with fatal MI at age 50.   • Stroke Brother         Brother with CVA in his 40's       Review of Systems   Constitutional: Positive for malaise/fatigue.   Cardiovascular: Positive for chest pain.   All other systems reviewed and are negative.     Objective:     Vitals:    10/27/21 1414   BP: 150/80   BP Location: Right arm   Pulse: 75   Resp: 18   SpO2: 98%   Weight: 135 kg (298 lb 6.4 oz)   Height: 170.2 cm (67\")     Body mass index is 46.74 kg/m².    Physical Exam  Constitutional:       " Appearance: He is well-developed.   HENT:      Head: Normocephalic and atraumatic.   Eyes:      Conjunctiva/sclera: Conjunctivae normal.   Cardiovascular:      Rate and Rhythm: Normal rate and regular rhythm.      Heart sounds: No murmur heard.  No friction rub. No gallop.    Pulmonary:      Effort: Pulmonary effort is normal.      Breath sounds: Normal breath sounds.   Abdominal:      Palpations: Abdomen is soft.      Tenderness: There is no abdominal tenderness.   Musculoskeletal:      Cervical back: Neck supple.   Skin:     General: Skin is warm.   Neurological:      Mental Status: He is alert and oriented to person, place, and time.   Psychiatric:         Behavior: Behavior normal.       Lab Review:     ECG 12 Lead    Date/Time: 10/27/2021 1:56 PM  Performed by: Andrea Martell MD  Authorized by: Andrea Martell MD   Comparison: compared with previous ECG from 10/23/2020  Similar to previous ECG  Rhythm: sinus rhythm  Conduction: 1st degree AV block  Other findings: poor R wave progression    Clinical impression: abnormal EKG            Cardiac Procedures:  1. Status post placement of a 3.0 x 12 mm PROMUS drug-eluting stent to the second   diagonal branch, as well as placement of a 4.0 x 16 mm PROMUS drug-eluting stent to   the mid LAD by Dr. Blayne Stewart on 01/21/2013.   2. Status post cutting balloon angioplasty to the ostium of the second diagonal branch for   an in-stent restenosis in 10/2013.   3. Normal ejection fraction of 60-65% by echocardiogram on 12/11/2012.   4. Exercise Myoview stress test on 02/09/2015: Normal with no ischemia or infarct.   5.  Left heart catheterization on 4/27/2017 by Dr. Welch: The left main was normal.  The   mid LAD had a complex bifurcation stent that was widely patent into the second   diagonal branch.  The first diagonal branch was a smaller vessel with a 40% ostial   stenosis.  The left circumflex was normal.  The right coronary artery was dominant and    normal.  6.  Echocardiogram on 5/31/2017: The ejection fraction was 60-65%.  There was grade 2   diastolic dysfunction.  The right ventricle was mildly dilated with normal function.  Left   atrium was mildly dilated.  There was no significant valvular disease.  7.  Lexiscan Myoview stress test on 10/29/2020: No ischemia or infarction.    Assessment:       Diagnosis Plan   1. Coronary artery disease involving native coronary artery of native heart, unspecified whether angina present  CT Angiogram Coronary   2. Chest pain, unspecified type  CT Angiogram Coronary   3. Primary hypertension  CT Angiogram Coronary     Plan:       I had a long discussion with him.  I last checked a Lexiscan Myoview stress test approximately 1 year ago.  He is again having chest pain which has occurred randomly, but has also occurred with exertion as well.  He clearly has risk factors for progression of coronary artery disease with his diabetes, weight, and hypertension.  I am going to recommend a coronary CT angiogram at this time.  I discussed this in detail with him, and he is in agreement.  He does have an IVP dye allergy, and he will be premedicated.  I also ordered metoprolol to take the night before and morning of the procedure.    I also suspect that his blood pressure may be too high at home.  He is not taking it routinely.  He is currently on lisinopril 20 mg/day.  For some reason, he is not on his carvedilol.  I am going to add this back at 3.125 mg twice a day.  He will continue the aspirin and Lipitor.

## 2021-11-23 ENCOUNTER — TELEPHONE (OUTPATIENT)
Dept: CARDIOLOGY | Facility: CLINIC | Age: 56
End: 2021-11-23

## 2021-11-23 RX ORDER — CARVEDILOL 3.12 MG/1
3.12 TABLET ORAL 2 TIMES DAILY
Qty: 180 TABLET | Refills: 3 | Status: SHIPPED | OUTPATIENT
Start: 2021-11-23 | End: 2021-12-15

## 2021-11-23 NOTE — TELEPHONE ENCOUNTER
Discussed CTA order with Dr Martell, he asked that I call the pt and give direction on his pre meds due to dye allergie.    I have spoken to the pt and went over the medications. He wrote down directions and verbalized understanding.    Thanks  Vale Gandhi RN  Triage nurse

## 2021-12-13 ENCOUNTER — TELEPHONE (OUTPATIENT)
Dept: CARDIOLOGY | Facility: CLINIC | Age: 56
End: 2021-12-13

## 2021-12-13 NOTE — TELEPHONE ENCOUNTER
He is schedule for a CT angiogram on 12/15/21.    He states that he need medication called in to his pharmacy prior to procedure? [address]    Do you send in medication prior to a CT angiogram?     PT# [phone]

## 2021-12-13 NOTE — TELEPHONE ENCOUNTER
I spoke to the pt and the pharmacy. They did recived the RX and will be available by the end of today.

## 2021-12-15 ENCOUNTER — HOSPITAL ENCOUNTER (OUTPATIENT)
Dept: CT IMAGING | Facility: HOSPITAL | Age: 56
Discharge: HOME | End: 2021-12-15
Admitting: INTERNAL MEDICINE

## 2021-12-15 VITALS
OXYGEN SATURATION: 97 % | HEART RATE: 63 BPM | TEMPERATURE: 98.2 F | RESPIRATION RATE: 16 BRPM | DIASTOLIC BLOOD PRESSURE: 76 MMHG | SYSTOLIC BLOOD PRESSURE: 151 MMHG

## 2021-12-15 DIAGNOSIS — I10 PRIMARY HYPERTENSION: ICD-10-CM

## 2021-12-15 DIAGNOSIS — I25.10 CORONARY ARTERY DISEASE INVOLVING NATIVE CORONARY ARTERY OF NATIVE HEART, UNSPECIFIED WHETHER ANGINA PRESENT: ICD-10-CM

## 2021-12-15 DIAGNOSIS — R07.9 CHEST PAIN, UNSPECIFIED TYPE: ICD-10-CM

## 2021-12-15 LAB
CREAT BLDA-MCNC: 0.6 MG/DL (ref 0.6–1.3)
QT INTERVAL: 427 MS

## 2021-12-15 PROCEDURE — 93005 ELECTROCARDIOGRAM TRACING: CPT | Performed by: INTERNAL MEDICINE

## 2021-12-15 PROCEDURE — 82565 ASSAY OF CREATININE: CPT

## 2021-12-15 PROCEDURE — 75574 CT ANGIO HRT W/3D IMAGE: CPT

## 2021-12-15 PROCEDURE — 75574 CT ANGIO HRT W/3D IMAGE: CPT | Performed by: INTERNAL MEDICINE

## 2021-12-15 PROCEDURE — 0 IOPAMIDOL PER 1 ML: Performed by: INTERNAL MEDICINE

## 2021-12-15 RX ORDER — LEVOFLOXACIN 500 MG/1
TABLET, FILM COATED ORAL
COMMUNITY
Start: 2021-09-02

## 2021-12-15 RX ORDER — PRASUGREL 10 MG/1
TABLET, FILM COATED ORAL
COMMUNITY

## 2021-12-15 RX ORDER — CEPHALEXIN 500 MG/1
CAPSULE ORAL
COMMUNITY
Start: 2021-08-14 | End: 2021-12-15

## 2021-12-15 RX ORDER — ISOSORBIDE MONONITRATE 30 MG/1
30 TABLET, EXTENDED RELEASE ORAL DAILY
COMMUNITY
End: 2022-12-14

## 2021-12-15 RX ORDER — METHYLPREDNISOLONE 4 MG/1
TABLET ORAL
COMMUNITY
Start: 2021-09-02 | End: 2022-12-14

## 2021-12-15 RX ORDER — NEBIVOLOL 5 MG/1
5 TABLET ORAL DAILY
COMMUNITY

## 2021-12-15 RX ORDER — HYDROCODONE BITARTRATE AND ACETAMINOPHEN 5; 325 MG/1; MG/1
1 TABLET ORAL
COMMUNITY
End: 2022-12-14

## 2021-12-15 RX ORDER — GUAIFENESIN 600 MG/1
TABLET, EXTENDED RELEASE ORAL
COMMUNITY
Start: 2021-08-14 | End: 2021-12-15 | Stop reason: SDUPTHER

## 2021-12-15 RX ORDER — LISINOPRIL 5 MG/1
5 TABLET ORAL DAILY
COMMUNITY
End: 2022-12-14

## 2021-12-15 RX ORDER — NAPROXEN 500 MG/1
500 TABLET ORAL
COMMUNITY
End: 2022-12-14

## 2021-12-15 RX ADMIN — IOPAMIDOL 100 ML: 755 INJECTION, SOLUTION INTRAVENOUS at 11:41

## 2021-12-15 NOTE — NURSING NOTE
Pt arrived for his CTA. Protective goggles and mask in place with all patient interactions today

## 2021-12-17 ENCOUNTER — DOCUMENTATION (OUTPATIENT)
Dept: CARDIOLOGY | Facility: CLINIC | Age: 56
End: 2021-12-17

## 2021-12-17 NOTE — PROGRESS NOTES
Cardiac CTA with morphology  Imaging done 12/15/21  Reason for the exam: chest pain    Calcium score is 2 Agatston units.  This is between the 1-25 percentile for men between the ages of 55-59 years old.    Heart rate 57 bpm.  Left ventricular end-diastolic volume 131 mL.  Left ventricular end-systolic volume 36 mL.  Ejection fraction 72%.  Stroke volume 95 mL.  Cardiac output 5425 mL/m    The right atrium is normal in size.  The right ventricle is normal in size.  There is grossly normal right ventricular systolic function.  The pulmonary artery is normal in size.  There are 3 pulmonary veins which enter the left atrium in their expected location 2 on the right and one on the left.  The left atrial appendage was visualized and is without thrombus.  The intra-atrial septum appeared to be intact.  The left ventricle is normal in size with normal systolic function.  There was no evidence of a left ventricular thrombus.  The intraventricular septum appeared to be intact.  The mitral valve appeared structurally normal.  The aortic valve was trileaflet and appears structurally and functionally normal.  The pulmonic valve appeared structurally normal.  The tricuspid valve appeared structurally normal.  There was no pericardial effusion.  The pericardium appeared normal.    The left main coronary artery came off the left coronary cusp in its anticipated location.  It bifurcated into the left anterior descending artery and the circumflex coronary artery.  There was no evidence of atherosclerotic disease of the left main coronary artery.  Left anterior descending artery wraps around the apex of the heart.  There is a stent in the proximal LAD and proximal diagonal.  The proximal LAD and mid LAD stent is patent, the diagonal stent may be occluded but there is flow past the stent in the mid and distal diagonal branch.  The circumflex artery is the nondominant vessel with no evidence of atherosclerotic disease.  The right  coronary artery is the dominant vessel with no evidence of atherosclerotic disease in the mid and distal vessel.  Artifact is noted in the proximal RCA but soft plaque in that region cannot be excluded.    Conclusions:  1.  Normal coronary artery anatomy with evidence of atherosclerotic disease, stent which is patent in the LAD and flow noted in the mid and distal diagonal, diagonal stent difficult to visualize.  Calcium score is 2 Agatston units.  2.  Structurally normal heart.      Yumiko Mayfield MD  12/17/21

## 2021-12-17 NOTE — PROGRESS NOTES
I called and gave him the results.  The stents in the LAD and diagonal are patent.  No significant disease in the RCA or left circumflex.    GM

## 2022-12-14 ENCOUNTER — OFFICE VISIT (OUTPATIENT)
Dept: CARDIOLOGY | Facility: CLINIC | Age: 57
End: 2022-12-14

## 2022-12-14 VITALS
OXYGEN SATURATION: 96 % | WEIGHT: 282 LBS | SYSTOLIC BLOOD PRESSURE: 124 MMHG | HEIGHT: 67 IN | BODY MASS INDEX: 44.26 KG/M2 | DIASTOLIC BLOOD PRESSURE: 82 MMHG | HEART RATE: 77 BPM

## 2022-12-14 DIAGNOSIS — R06.09 DOE (DYSPNEA ON EXERTION): Primary | ICD-10-CM

## 2022-12-14 DIAGNOSIS — I10 PRIMARY HYPERTENSION: ICD-10-CM

## 2022-12-14 DIAGNOSIS — I25.10 CORONARY ARTERY DISEASE INVOLVING NATIVE CORONARY ARTERY OF NATIVE HEART, UNSPECIFIED WHETHER ANGINA PRESENT: ICD-10-CM

## 2022-12-14 PROCEDURE — 99214 OFFICE O/P EST MOD 30 MIN: CPT | Performed by: NURSE PRACTITIONER

## 2022-12-14 PROCEDURE — 93000 ELECTROCARDIOGRAM COMPLETE: CPT | Performed by: NURSE PRACTITIONER

## 2022-12-14 NOTE — PROGRESS NOTES
"    CARDIOLOGY        Patient Name: David Henry  :1965  Age: 57 y.o.  Primary Cardiologist: Vito Martell MD and Spencer Awan MD  Encounter Provider:  AMANDA Spence    Date of Service: 22      CHIEF COMPLAINT / REASON FOR OFFICE VISIT     Coronary Artery Disease (6 month f/u)      HISTORY OF PRESENT ILLNESS       HPI  David Henry is a 57 y.o. male who presents today for semiannual evaluation.     Pt has a  history significant for CAD, hypertension.    Patient states that he has been stable since last assessment.  He reports that he still has intermittent episodes of chest discomfort that are unchanged from last year.  Patient was complaining of similar complaints last year.  He had CTA of the chest which revealed patent stents and no other acute findings.  He does get short of breath when he is walking up a hill.  He also reports that he has positional episodes of lightheadedness and sometimes intermittent waves of lightheadedness.  He does note he has intermittent edema, does not complain of any weight gain.  Reports that his energy level is stable.  He is tolerating all medications without adverse effects.      The following portions of the patient's history were reviewed and updated as appropriate: allergies, current medications, past family history, past medical history, past social history, past surgical history and problem list.      VITAL SIGNS     Visit Vitals  /82 (BP Location: Left arm, Patient Position: Sitting, Cuff Size: Large Adult)   Pulse 77   Ht 170.2 cm (67\")   Wt 128 kg (282 lb)   SpO2 96%   BMI 44.17 kg/m²         Wt Readings from Last 3 Encounters:   22 128 kg (282 lb)   10/27/21 135 kg (298 lb 6.4 oz)   10/29/20 126 kg (278 lb)     Body mass index is 44.17 kg/m².      REVIEW OF SYSTEMS   Review of Systems   Constitutional: Negative for malaise/fatigue.   Cardiovascular: Positive for chest pain and dyspnea on exertion. Negative for leg swelling. "   Respiratory: Negative for shortness of breath.    Neurological: Positive for light-headedness.   All other systems reviewed and are negative.          PHYSICAL EXAMINATION     Constitutional:       Appearance: Normal appearance. Well-developed.   Eyes:      Conjunctiva/sclera: Conjunctivae normal.   Neck:      Vascular: No carotid bruit.   Pulmonary:      Effort: Pulmonary effort is normal.      Breath sounds: Normal breath sounds.   Cardiovascular:      Normal rate. Regular rhythm. Normal S1. Normal S2.      Murmurs: There is no murmur.      No gallop. No click. No rub.   Edema:     Peripheral edema absent.   Musculoskeletal: Normal range of motion. Skin:     General: Skin is warm and dry.   Neurological:      Mental Status: Alert and oriented to person, place, and time.      GCS: GCS eye subscore is 4. GCS verbal subscore is 5. GCS motor subscore is 6.   Psychiatric:         Speech: Speech normal.         Behavior: Behavior normal.         Thought Content: Thought content normal.         Judgment: Judgment normal.           REVIEWED DATA       ECG 12 Lead    Date/Time: 12/14/2022 2:41 PM  Performed by: Audrey Monroe APRN  Authorized by: Audrey Monroe APRN   Comparison: compared with previous ECG   Similar to previous ECG  Rhythm: sinus rhythm  Rate: normal  BPM: 73  Conduction: conduction normal  ST Segments: ST segments normal  T Waves: T waves normal  QRS axis: normal    Clinical impression: normal ECG            Cardiac Procedures:  1. Myocardial perfusion PET stress test 10/29/2020.  Negative for ischemia.  2. CTA coronary 12/17/2021.  Normal coronary anatomy with evidence of CAD.  Stent which is patent in the LAD and flow noted in the mid and distal diagonal, diagonal stent difficult to visualize.  Calcium score 2.  Structurally normal heart.      BUN   Date Value Ref Range Status   10/29/2020 11 6 - 20 mg/dL Final     Creatinine   Date Value Ref Range Status   12/15/2021 0.60 0.60 - 1.30 mg/dL  Final     Comment:     Serial Number: [id number]:  [id number]     Potassium   Date Value Ref Range Status   10/29/2020 4.2 3.5 - 5.2 mmol/L Final           ASSESSMENT & PLAN     Diagnoses and all orders for this visit:    1. CHERY (dyspnea on exertion) (Primary)  · Patient reports that he has been experiencing dyspnea with exertion with climbing a hill.  · He had CTA coronary within the past year which revealed stable coronary disease.  Has not had echocardiogram since 2017.  We will get echocardiogram to evaluate structure and function of heart.  -     Adult Transthoracic Echo Complete W/ Cont if Necessary Per Protocol; Future    2. Coronary artery disease involving native coronary artery of native heart, unspecified whether angina present  · Patient continues to have atypical chest pain.  This has been evaluated in the past with nuclear perfusion imaging and CTA coronary  · Patient experiencing dyspnea with exertion, will get echocardiogram as noted above  · Continue aspirin, Effient, atorvastatin, Bystolic    3. Primary hypertension  · Blood pressure adequately controlled in clinic today at 124/82  · Continue Bystolic 5 mg/day          Return in about 6 months (around 6/14/2023) for  transitioning from Dr. Martell. Prefers [facility] location.    Future Appointments       Provider Department Center    12/29/2022 7:45 AM [facility] ECHO/[facility] OUTPATIENT ECHOCARDIOGRAPHY [facility]    6/21/2023 12:30 PM Spencer wAan MD [facility] CARDIOLOGY [facility]                MEDICATIONS         Discharge Medications          Accurate as of December 14, 2022  4:37 PM. If you have any questions, ask your nurse or doctor.            Changes to Medications      Instructions Start Date   lisinopril 20 MG tablet  Commonly known as: PRINIVIL,ZESTRIL  What changed: Another medication with the same name was removed. Continue taking this medication, and follow the directions you see  here.  Changed by: AMANDA Spence   20 mg, Oral, Daily         Continue These Medications      Instructions Start Date   Aspirin Buf(CaCarb-MgCarb-MgO) 81 MG tablet   81 mg, Oral, Daily      atorvastatin 40 MG tablet  Commonly known as: LIPITOR   40 mg, Oral, Daily      glimepiride 4 MG tablet  Commonly known as: AMARYL   4 mg, Oral, 2 Times Daily      levoFLOXacin 500 MG tablet  Commonly known as: LEVAQUIN   No dose, route, or frequency recorded.      nebivolol 5 MG tablet  Commonly known as: BYSTOLIC   5 mg, Oral, Daily      nitroglycerin 0.4 MG SL tablet  Commonly known as: NITROSTAT   0.4 mg, Sublingual, Every 5 Minutes PRN, Take no more than 3 doses in 15 minutes.      prasugrel 10 MG tablet  Commonly known as: EFFIENT   Oral      SITagliptin 100 MG tablet  Commonly known as: JANUVIA   100 mg, Oral, Daily         Stop These Medications    diphenhydrAMINE 50 MG tablet  Commonly known as: BENADRYL  Stopped by: AMANDA Spence     famotidine 20 MG tablet  Commonly known as: Pepcid  Stopped by: AMANDA Spence     HYDROcodone-acetaminophen 5-325 MG per tablet  Commonly known as: NORCO  Stopped by: AMANDA Spence     isosorbide mononitrate 30 MG 24 hr tablet  Commonly known as: IMDUR  Stopped by: AMANDA Spence     Jardiance 25 MG tablet tablet  Generic drug: empagliflozin  Stopped by: AMANDA Spence     methylPREDNISolone 4 MG dose pack  Commonly known as: MEDROL  Stopped by: AMANDA Spence     metoprolol tartrate 25 MG tablet  Commonly known as: LOPRESSOR  Stopped by: AMANDA Spence     naproxen 500 MG EC tablet  Commonly known as: EC NAPROSYN  Stopped by: AMANDA Spence     predniSONE 20 MG tablet  Commonly known as: DELTASONE  Stopped by: AMADNA Spence                **Dragon Disclaimer:   Much of this encounter note is an electronic transcription/translation of spoken language to printed text. The electronic translation of spoken  language may permit erroneous, or at times, nonsensical words or phrases to be inadvertently transcribed. Although I have reviewed the note for such errors, some may still exist.

## 2022-12-29 ENCOUNTER — HOSPITAL ENCOUNTER (OUTPATIENT)
Dept: CARDIOLOGY | Facility: HOSPITAL | Age: 57
Discharge: HOME | End: 2022-12-29
Admitting: NURSE PRACTITIONER

## 2022-12-29 VITALS
DIASTOLIC BLOOD PRESSURE: 72 MMHG | HEART RATE: 79 BPM | BODY MASS INDEX: 44.26 KG/M2 | HEIGHT: 67 IN | SYSTOLIC BLOOD PRESSURE: 120 MMHG | OXYGEN SATURATION: 96 % | WEIGHT: 282 LBS

## 2022-12-29 DIAGNOSIS — R06.09 DOE (DYSPNEA ON EXERTION): ICD-10-CM

## 2022-12-29 LAB
AORTIC ARCH: 2.7 CM
ASCENDING AORTA: 3.1 CM
BH CV ECHO MEAS - ACS: 2.09 CM
BH CV ECHO MEAS - AO MAX PG: 6.4 MMHG
BH CV ECHO MEAS - AO MEAN PG: 3.9 MMHG
BH CV ECHO MEAS - AO ROOT DIAM: 3.2 CM
BH CV ECHO MEAS - AO V2 MAX: 126.2 CM/SEC
BH CV ECHO MEAS - AO V2 VTI: 24.9 CM
BH CV ECHO MEAS - AVA(I,D): 3.4 CM2
BH CV ECHO MEAS - EDV(CUBED): 105 ML
BH CV ECHO MEAS - EDV(MOD-SP2): 102 ML
BH CV ECHO MEAS - EDV(MOD-SP4): 71 ML
BH CV ECHO MEAS - EF(MOD-BP): 55.1 %
BH CV ECHO MEAS - EF(MOD-SP2): 58.8 %
BH CV ECHO MEAS - EF(MOD-SP4): 49.3 %
BH CV ECHO MEAS - ESV(CUBED): 41.6 ML
BH CV ECHO MEAS - ESV(MOD-SP2): 42 ML
BH CV ECHO MEAS - ESV(MOD-SP4): 36 ML
BH CV ECHO MEAS - FS: 26.5 %
BH CV ECHO MEAS - IVS/LVPW: 1.01 CM
BH CV ECHO MEAS - IVSD: 1.12 CM
BH CV ECHO MEAS - LAT PEAK E' VEL: 8.1 CM/SEC
BH CV ECHO MEAS - LV DIASTOLIC VOL/BSA (35-75): 30.3 CM2
BH CV ECHO MEAS - LV MASS(C)D: 191.8 GRAMS
BH CV ECHO MEAS - LV MAX PG: 2.18 MMHG
BH CV ECHO MEAS - LV MEAN PG: 1.41 MMHG
BH CV ECHO MEAS - LV SYSTOLIC VOL/BSA (12-30): 15.4 CM2
BH CV ECHO MEAS - LV V1 MAX: 73.8 CM/SEC
BH CV ECHO MEAS - LV V1 VTI: 19.5 CM
BH CV ECHO MEAS - LVIDD: 4.7 CM
BH CV ECHO MEAS - LVIDS: 3.5 CM
BH CV ECHO MEAS - LVOT AREA: 4.3 CM2
BH CV ECHO MEAS - LVOT DIAM: 2.34 CM
BH CV ECHO MEAS - LVPWD: 1.11 CM
BH CV ECHO MEAS - MED PEAK E' VEL: 6.4 CM/SEC
BH CV ECHO MEAS - MV A DUR: 0.12 SEC
BH CV ECHO MEAS - MV A MAX VEL: 73.3 CM/SEC
BH CV ECHO MEAS - MV DEC SLOPE: 654.5 CM/SEC2
BH CV ECHO MEAS - MV DEC TIME: 0.13 MSEC
BH CV ECHO MEAS - MV E MAX VEL: 95.7 CM/SEC
BH CV ECHO MEAS - MV E/A: 1.31
BH CV ECHO MEAS - MV MAX PG: 4 MMHG
BH CV ECHO MEAS - MV MEAN PG: 2.13 MMHG
BH CV ECHO MEAS - MV P1/2T: 45 MSEC
BH CV ECHO MEAS - MV V2 VTI: 27.5 CM
BH CV ECHO MEAS - MVA(P1/2T): 4.9 CM2
BH CV ECHO MEAS - MVA(VTI): 3.1 CM2
BH CV ECHO MEAS - PA ACC TIME: 0.07 SEC
BH CV ECHO MEAS - PA PR(ACCEL): 48.9 MMHG
BH CV ECHO MEAS - PA V2 MAX: 105.9 CM/SEC
BH CV ECHO MEAS - PULM A REVS DUR: 0.1 SEC
BH CV ECHO MEAS - PULM A REVS VEL: 28.3 CM/SEC
BH CV ECHO MEAS - PULM DIAS VEL: 32.1 CM/SEC
BH CV ECHO MEAS - PULM S/D: 1.29
BH CV ECHO MEAS - PULM SYS VEL: 41.6 CM/SEC
BH CV ECHO MEAS - QP/QS: 0.66
BH CV ECHO MEAS - RAP SYSTOLE: 3 MMHG
BH CV ECHO MEAS - RV MAX PG: 1.79 MMHG
BH CV ECHO MEAS - RV V1 MAX: 66.8 CM/SEC
BH CV ECHO MEAS - RV V1 VTI: 16 CM
BH CV ECHO MEAS - RVOT DIAM: 2.11 CM
BH CV ECHO MEAS - SI(MOD-SP2): 25.6 ML/M2
BH CV ECHO MEAS - SI(MOD-SP4): 15 ML/M2
BH CV ECHO MEAS - SUP REN AO DIAM: 2 CM
BH CV ECHO MEAS - SV(LVOT): 84 ML
BH CV ECHO MEAS - SV(MOD-SP2): 60 ML
BH CV ECHO MEAS - SV(MOD-SP4): 35 ML
BH CV ECHO MEAS - SV(RVOT): 55.7 ML
BH CV ECHO MEAS - TAPSE (>1.6): 2.5 CM
BH CV ECHO MEASUREMENTS AVERAGE E/E' RATIO: 13.2
BH CV XLRA - RV BASE: 2.7 CM
BH CV XLRA - RV LENGTH: 7.7 CM
BH CV XLRA - RV MID: 3.7 CM
BH CV XLRA - TDI S': 14.4 CM/SEC
LEFT ATRIUM VOLUME INDEX: 21.7 ML/M2
MAXIMAL PREDICTED HEART RATE: 164 BPM
SINUS: 3 CM
STJ: 3.2 CM
STRESS TARGET HR: 139 BPM

## 2022-12-29 PROCEDURE — 93306 TTE W/DOPPLER COMPLETE: CPT

## 2022-12-29 PROCEDURE — 25010000002 PERFLUTREN (DEFINITY) 8.476 MG IN SODIUM CHLORIDE (PF) 0.9 % 10 ML INJECTION: Performed by: NURSE PRACTITIONER

## 2022-12-29 PROCEDURE — 93306 TTE W/DOPPLER COMPLETE: CPT | Performed by: INTERNAL MEDICINE

## 2022-12-29 RX ADMIN — PERFLUTREN 1.5 ML: 6.52 INJECTION, SUSPENSION INTRAVENOUS at 08:11

## 2022-12-29 NOTE — PROGRESS NOTES
Left message for patient with stable echocardiogram results.  Encouraged to call for further questions.

## 2023-06-21 PROBLEM — I10 ESSENTIAL HYPERTENSION: Status: ACTIVE | Noted: 2023-06-21

## 2023-06-21 PROBLEM — I25.10 CORONARY ARTERY DISEASE INVOLVING NATIVE CORONARY ARTERY OF NATIVE HEART WITHOUT ANGINA PECTORIS: Status: ACTIVE | Noted: 2023-06-21

## 2023-06-21 PROBLEM — E78.00 HYPERCHOLESTEROLEMIA: Status: ACTIVE | Noted: 2023-06-21

## 2023-11-07 ENCOUNTER — TELEPHONE (OUTPATIENT)
Dept: CARDIOLOGY | Facility: CLINIC | Age: 58
End: 2023-11-07
Payer: COMMERCIAL

## 2023-11-07 NOTE — TELEPHONE ENCOUNTER
I called and spoke with pt. He denied any symptoms. Clearance was faxed to [facility].

## 2024-06-19 ENCOUNTER — OFFICE VISIT (OUTPATIENT)
Dept: CARDIOLOGY | Facility: CLINIC | Age: 59
End: 2024-06-19
Payer: COMMERCIAL

## 2024-06-19 VITALS
SYSTOLIC BLOOD PRESSURE: 118 MMHG | OXYGEN SATURATION: 96 % | DIASTOLIC BLOOD PRESSURE: 74 MMHG | BODY MASS INDEX: 42.06 KG/M2 | HEART RATE: 82 BPM | HEIGHT: 67 IN | WEIGHT: 268 LBS

## 2024-06-19 DIAGNOSIS — E78.00 HYPERCHOLESTEROLEMIA: ICD-10-CM

## 2024-06-19 DIAGNOSIS — I25.10 CORONARY ARTERY DISEASE INVOLVING NATIVE CORONARY ARTERY OF NATIVE HEART WITHOUT ANGINA PECTORIS: Primary | ICD-10-CM

## 2024-06-19 DIAGNOSIS — I10 ESSENTIAL HYPERTENSION: ICD-10-CM

## 2024-06-19 DIAGNOSIS — R73.9 HYPERGLYCEMIA: ICD-10-CM

## 2024-06-19 PROCEDURE — 99214 OFFICE O/P EST MOD 30 MIN: CPT | Performed by: INTERNAL MEDICINE

## 2024-06-19 PROCEDURE — 93000 ELECTROCARDIOGRAM COMPLETE: CPT | Performed by: INTERNAL MEDICINE

## 2024-06-19 RX ORDER — NITROGLYCERIN 0.4 MG/1
0.4 TABLET SUBLINGUAL
Qty: 100 TABLET | Refills: 3 | Status: SHIPPED | OUTPATIENT
Start: 2024-06-19

## 2024-06-19 RX ORDER — TIRZEPATIDE 5 MG/.5ML
5 INJECTION, SOLUTION SUBCUTANEOUS WEEKLY
COMMUNITY
Start: 2024-01-29

## 2024-06-19 NOTE — PROGRESS NOTES
PATIENTINFORMATION    Date of Office Visit: 2024  Encounter Provider: Spencer Awan MD  Place of Service: [facility] CARDIOLOGY  Patient Name: David Henry  : 1965    Subjective:     Encounter Date:2024      Patient ID: David Henry is a 59 y.o. male.    Chief Complaint   Patient presents with    Dyspnea on exeertion       HPI  Mr. Henry is a pleasant 59 years old gentleman who came to cardiac clinic for follow-up visit.  He reports occasional lightheadedness but he had prolonged episode yesterday.  He did not check his blood pressure.  He denies fainting, palpitations.  Compliant with all current medications without known significant side effects.  He is losing weight with lifestyle changes and GLP-1 agonist therapy.  Denies rest or exertional chest discomfort  Denies symptoms of heart failure      ROS  All systems reviewed and negative except as noted in HPI.    Past Medical History:   Diagnosis Date    Allergic reaction to contrast material     IVP dye allergy    Coronary artery disease     Diabetes mellitus     Difficulty breathing     Secondary to Brilinta in the past.    Heart murmur     PER PT    Hyperlipidemia     Hypertension     Obesity     TAMMY (obstructive sleep apnea)        Past Surgical History:   Procedure Laterality Date    APPENDECTOMY      CARDIAC CATHETERIZATION N/A 2017    Procedure: Coronary angiography;  Surgeon: Reji Welch MD;  Location: [facility] CATH INVASIVE LOCATION;  Service:     CARDIAC CATHETERIZATION N/A 2017    Procedure: Left heart cath;  Surgeon: Reji Welch MD;  Location: [facility] CATH INVASIVE LOCATION;  Service:     CARDIAC CATHETERIZATION N/A 2017    Procedure: Left ventriculography;  Surgeon: Reji Welch MD;  Location: [facility] CATH INVASIVE LOCATION;  Service:     FACIAL RECONSTRUCTION SURGERY      FOREHEAD       Social History     Socioeconomic History    Marital status:    Tobacco Use    Smoking status:  "Never    Smokeless tobacco: Never   Substance and Sexual Activity    Alcohol use: Yes     Comment: Socially     Drug use: No    Sexual activity: Defer       Family History   Problem Relation Age of Onset    Coronary artery disease Mother         Stenting in her late 60's    Coronary artery disease Father         Father with fatal MI at age 50.    Stroke Brother         Brother with CVA in his 40's           ECG 12 Lead    Date/Time: 6/19/2024 12:42 PM  Performed by: Spencer Awan MD    Authorized by: Spencer Awan MD  Comparison: compared with previous ECG from 6/21/2023  Similar to previous ECG  Rhythm: sinus rhythm  Rate: normal  Conduction: conduction normal  Q waves: III and aVF    ST Segments: ST segments normal  T Waves: T waves normal  QRS axis: normal  Other: no other findings    Clinical impression: abnormal EKG and myocardial infarction             Objective:     /74   Pulse 82   Ht 170.2 cm (67\")   Wt 122 kg (268 lb)   SpO2 96%   BMI 41.97 kg/m²  Body mass index is 41.97 kg/m².     Constitutional:       General: Not in acute distress.     Appearance: Well-developed. Not diaphoretic.   Eyes:      Pupils: Pupils are equal, round, and reactive to light.   HENT:      Head: Normocephalic and atraumatic.   Neck:      Thyroid: No thyromegaly.   Pulmonary:      Effort: Pulmonary effort is normal. No respiratory distress.      Breath sounds: Normal breath sounds. No wheezing. No rales.   Chest:      Chest wall: Not tender to palpatation.   Cardiovascular:      Normal rate. Regular rhythm.      No gallop.    Pulses:     Intact distal pulses.   Edema:     Peripheral edema absent.   Abdominal:      General: Bowel sounds are normal. There is no distension.      Palpations: Abdomen is soft.      Tenderness: There is no guarding.   Musculoskeletal: Normal range of motion.         General: No deformity.      Cervical back: Normal range of motion and neck supple. Skin:     General: Skin is warm " and dry.      Findings: No rash.   Neurological:      Mental Status: Alert and oriented to person, place, and time.      Cranial Nerves: No cranial nerve deficit.      Deep Tendon Reflexes: Reflexes are normal and symmetric.   Psychiatric:         Judgment: Judgment normal.         Review Of Data: I have reviewed pertinent recent labs, images and documents and pertinent findings included in HPI or assessment below.          Assessment/Plan:       Coronary artery disease-status post bifurcation stenting of diagonal branch and mid left anterior descending artery with 2 drug-eluting stent in January 2013, another stent to second diagonal branch in 10/2013.  Patent stents per coronary angiogram in April 2017 and coronary CTA in December 2021.  Normal echocardiogram in December 2022.   Essential hypertension good control with current regimen.  Type 2 diabetes mellitus on Mounjaro and metformin  Hypercholesterolemia on statin  Intermittent dizzy spells likely from hypotension.  Lisinopril dose reduced during prior visit.  Obesity with sleep apnea using CPAP machine faithfully  Abnormal EKG with inferior Q waves    Except occasional dizzy spells doing fairly well from cardiac standpoint.  Encouraged regular physical exercise.  Do basic labs-May reduce dose of lisinopril with TIFFANI.  Fu in one year or sooner with concerns   Diagnosis and plan of care discussed with patient and verbalized understanding.            Your medication list            Accurate as of June 19, 2024 12:44 PM. If you have any questions, ask your nurse or doctor.                CONTINUE taking these medications        Instructions Last Dose Given Next Dose Due   Accu-Chek Guide Me w/Device kit           Aspirin Buf(CaCarb-MgCarb-MgO) 81 MG tablet      Take 81 mg by mouth Daily.       atorvastatin 40 MG tablet  Commonly known as: LIPITOR      Take 1 tablet by mouth Daily.       carvedilol 3.125 MG tablet  Commonly known as: COREG      Take 1 tablet by  mouth 2 (Two) Times a Day With Meals.       Farxiga 10 MG tablet  Generic drug: dapagliflozin Propanediol      Take 10 mg by mouth Daily.       lisinopril 5 MG tablet  Commonly known as: PRINIVIL,ZESTRIL      Take 1 tablet by mouth Daily.       meloxicam 15 MG tablet  Commonly known as: MOBIC      Take 1 tablet by mouth Daily.       metFORMIN  MG 24 hr tablet  Commonly known as: GLUCOPHAGE-XR      Take 1 tablet by mouth Daily With Breakfast.       Mounjaro 5 MG/0.5ML solution pen-injector pen  Generic drug: Tirzepatide      Inject 0.5 mL under the skin into the appropriate area as directed 1 (One) Time Per Week.       nitroglycerin 0.4 MG SL tablet  Commonly known as: NITROSTAT      Place 1 tablet under the tongue Every 5 (Five) Minutes As Needed for Chest Pain. Take no more than 3 doses in 15 minutes.                 Where to Get Your Medications        These medications were sent to [facility] [id number] - [address] AT [facility] - [phone] [facility] [phone] [place] [address]      Phone: [phone]   nitroglycerin 0.4 MG SL tablet             Spencer Awan MD  06/19/24  12:44 EDT

## 2025-06-19 RX ORDER — NITROGLYCERIN 0.4 MG/1
0.4 TABLET SUBLINGUAL AS NEEDED
Qty: 100 TABLET | Refills: 1 | Status: SHIPPED | OUTPATIENT
Start: 2025-06-19

## 2025-06-25 ENCOUNTER — OFFICE VISIT (OUTPATIENT)
Dept: CARDIOLOGY | Facility: CLINIC | Age: 60
End: 2025-06-25
Payer: COMMERCIAL

## 2025-06-25 VITALS
HEIGHT: 67 IN | HEART RATE: 80 BPM | DIASTOLIC BLOOD PRESSURE: 78 MMHG | BODY MASS INDEX: 42.28 KG/M2 | OXYGEN SATURATION: 93 % | WEIGHT: 269.4 LBS | SYSTOLIC BLOOD PRESSURE: 128 MMHG

## 2025-06-25 DIAGNOSIS — I10 ESSENTIAL HYPERTENSION: ICD-10-CM

## 2025-06-25 DIAGNOSIS — E78.00 HYPERCHOLESTEROLEMIA: ICD-10-CM

## 2025-06-25 DIAGNOSIS — G47.33 OSA ON CPAP: ICD-10-CM

## 2025-06-25 DIAGNOSIS — I25.10 CORONARY ARTERY DISEASE INVOLVING NATIVE CORONARY ARTERY OF NATIVE HEART WITHOUT ANGINA PECTORIS: Primary | ICD-10-CM

## 2025-06-25 PROCEDURE — 93000 ELECTROCARDIOGRAM COMPLETE: CPT | Performed by: INTERNAL MEDICINE

## 2025-06-25 PROCEDURE — 99214 OFFICE O/P EST MOD 30 MIN: CPT | Performed by: INTERNAL MEDICINE

## 2025-06-25 RX ORDER — LISINOPRIL 5 MG/1
5 TABLET ORAL DAILY
Qty: 90 TABLET | Refills: 3
Start: 2025-06-25

## 2025-06-25 RX ORDER — CARVEDILOL 3.12 MG/1
3.12 TABLET ORAL DAILY
Qty: 180 TABLET | Refills: 3 | Status: SHIPPED | OUTPATIENT
Start: 2025-06-25

## 2025-06-25 RX ORDER — INSULIN GLARGINE 300 U/ML
10 INJECTION, SOLUTION SUBCUTANEOUS
COMMUNITY
Start: 2025-02-18

## 2025-06-25 RX ORDER — LIDOCAINE 50 MG/G
PATCH TOPICAL
COMMUNITY
Start: 2025-04-09

## 2025-06-25 RX ORDER — ACYCLOVIR 800 MG/1
TABLET ORAL
COMMUNITY
Start: 2025-06-19

## 2025-06-25 NOTE — PROGRESS NOTES
PATIENTINFORMATION    Date of Office Visit: 2025  Encounter Provider: Spencer Awan MD  Place of Service: [facility] CARDIOLOGY  Patient Name: David Henry  : 1965    Subjective:     Encounter Date:2025      Patient ID: David Henry is a 60 y.o. male.    Chief Complaint   Patient presents with    Follow-up     Coronary artery disease involving native coronary artery of native heart without angina pectoris       HPI  Mr. Henry is a pleasant 56 years old gentleman who came to cardiac clinic for follow-up visit.  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  He reports occasional exertional chest discomfort but not always in a predictable manner he has chronic intermittent right shoulder pain that comes at rest and with certain shoulder movement.  He has not used any nitroglycerin this year.  Compliant with current medications.  Blood pressure runs normal during home monitoring.  No recent ER visits or hospitalization.      ROS  All systems reviewed and negative except as noted in HPI.    Past Medical History:   Diagnosis Date    Allergic reaction to contrast material     IVP dye allergy    Coronary artery disease     Diabetes mellitus     Difficulty breathing     Secondary to Brilinta in the past.    Heart murmur     PER PT    Hyperlipidemia     Hypertension     Obesity     TAMMY (obstructive sleep apnea)        Past Surgical History:   Procedure Laterality Date    APPENDECTOMY      CARDIAC CATHETERIZATION N/A 2017    Procedure: Coronary angiography;  Surgeon: Reji Welch MD;  Location: [facility] INVASIVE LOCATION;  Service:     CARDIAC CATHETERIZATION N/A 2017    Procedure: Left heart cath;  Surgeon: Reji Welch MD;  Location: [facility] INVASIVE LOCATION;  Service:     CARDIAC CATHETERIZATION N/A 2017    Procedure: Left ventriculography;  Surgeon: Reji Welch MD;  Location: [facility] INVASIVE LOCATION;   "Service:     FACIAL RECONSTRUCTION SURGERY      FOREHEAD       Social History     Socioeconomic History    Marital status:     Number of children: 3   Tobacco Use    Smoking status: Never     Passive exposure: Never    Smokeless tobacco: Never    Tobacco comments:     Drinks 4 cans of diet coke daily   Vaping Use    Vaping status: Never Used   Substance and Sexual Activity    Alcohol use: Yes     Comment: Socially     Drug use: No    Sexual activity: Defer       Family History   Problem Relation Age of Onset    Coronary artery disease Mother         Stenting in her late 60's    Coronary artery disease Father         Father with fatal MI at age 50.    Stroke Brother         Brother with CVA in his 40's           ECG 12 Lead    Date/Time: 6/25/2025 11:58 AM  Performed by: Spencer Awan MD    Authorized by: Spencer Awan MD  Comparison: compared with previous ECG from 6/19/2024  Similar to previous ECG  Rhythm: sinus rhythm  Rate: normal  Q waves: III and aVF    ST Segments: ST segments normal  T Waves: T waves normal  QRS axis: normal  Other: no other findings    Clinical impression: abnormal EKG             Objective:     /78   Pulse 80   Ht 170.2 cm (67\")   Wt 122 kg (269 lb 6.4 oz)   SpO2 93%   BMI 42.19 kg/m²  Body mass index is 42.19 kg/m².     Constitutional:       General: Not in acute distress.     Appearance: Well-developed. Not diaphoretic.   Eyes:      Pupils: Pupils are equal, round, and reactive to light.   HENT:      Head: Normocephalic and atraumatic.   Neck:      Thyroid: No thyromegaly.   Pulmonary:      Effort: Pulmonary effort is normal. No respiratory distress.      Breath sounds: Normal breath sounds. No wheezing. No rales.   Chest:      Chest wall: Not tender to palpatation.   Cardiovascular:      Normal rate. Regular rhythm.      No gallop.    Pulses:     Intact distal pulses.   Edema:     Peripheral edema absent.   Abdominal:      General: Bowel sounds are " normal. There is no distension.      Palpations: Abdomen is soft.      Tenderness: There is no guarding.   Musculoskeletal: Normal range of motion.         General: No deformity.      Cervical back: Normal range of motion and neck supple. Skin:     General: Skin is warm and dry.      Findings: No rash.   Neurological:      Mental Status: Alert and oriented to person, place, and time.      Cranial Nerves: No cranial nerve deficit.      Deep Tendon Reflexes: Reflexes are normal and symmetric.   Psychiatric:         Judgment: Judgment normal.         Review Of Data: I have reviewed pertinent recent labs, images and documents and pertinent findings included in HPI or assessment below.    Lipid Panel          8/30/2024    14:42   Lipid Panel   Total Cholesterol 159       Triglycerides 95       HDL Cholesterol 71       LDL Cholesterol  70          Details          This result is from an external source.             Assessment/Plan:         Coronary artery disease-status post bifurcation stenting of diagonal branch and mid left anterior descending artery with 2 drug-eluting stent in January 2013, another stent to second diagonal branch in 10/2013.  Patent stents per coronary angiogram in April 2017 and coronary CTA in December 2021.  Normal echocardiogram in December 2022.   Essential hypertension   Type 2 diabetes mellitus on Mounjaro and metformin  Hypercholesterolemia on statin  Prior history of dizzy spells that resolved after lisinopril dose reduced.  Obesity with sleep apnea using CPAP machine faithfully-lost more than 50 pounds on Mounjaro.  Abnormal EKG with inferior Q waves     Occasional exertional chest discomfort.  He is under a lot of stress after he was laid off from his work.  I will send for treadmill test.  Excellent blood pressure control and lipid panel.  Otherwise continue current care  FU in 6 months or sooner with concerns     Diagnosis and plan of care discussed with patient and verbalized  understanding.            Your medication list            Accurate as of June 25, 2025 12:00 PM. If you have any questions, ask your nurse or doctor.                CONTINUE taking these medications        Instructions Last Dose Given Next Dose Due   Accu-Chek Guide Me w/Device kit           Aspirin Buf(CaCarb-MgCarb-MgO) 81 MG tablet      Take 81 mg by mouth Daily.       atorvastatin 40 MG tablet  Commonly known as: LIPITOR      Take 1 tablet by mouth Daily.       carvedilol 3.125 MG tablet  Commonly known as: COREG      Take 1 tablet by mouth Daily. Changed per patient and updated on 6/25/2025       Farxiga 10 MG tablet  Generic drug: dapagliflozin Propanediol      Take 10 mg by mouth Daily.       FreeStyle Pee 3 Sensor misc           lidocaine 5 %  Commonly known as: LIDODERM      APPLY 1 PATCH TOPICALLY TO SKIN  DAILY MAY BE LEFT ON UP TO 12  HOURS WITHIN A 24 HOUR PERIOD       lisinopril 5 MG tablet  Commonly known as: PRINIVIL,ZESTRIL      Take 1 tablet by mouth Daily.       meloxicam 15 MG tablet  Commonly known as: MOBIC      Take 1 tablet by mouth Daily.       metFORMIN  MG 24 hr tablet  Commonly known as: GLUCOPHAGE-XR      Take 1 tablet by mouth Daily With Breakfast.       Mounjaro 5 MG/0.5ML solution pen-injector  Generic drug: Tirzepatide      Inject 0.5 mL under the skin into the appropriate area as directed 1 (One) Time Per Week.       nitroglycerin 0.4 MG SL tablet  Commonly known as: NITROSTAT      DISSOLVE 1 TAB UNDER TONGUE FOR CHEST PAIN - IF PAIN REMAINS AFTER 5 MIN, CALL 911 AND REPEAT DOSE. MAX 3 TABS IN 15 MINUTES       Toujeo Max SoloStar 300 UNIT/ML solution pen-injector injection  Generic drug: Insulin Glargine (2 Unit Dial)      Inject 10 Units under the skin into the appropriate area as directed.                 Where to Get Your Medications        These medications were sent to [facility] [id number] - [place], [place] - [address] [place] [address] AT [facility] -  [phone]  - [phone] FX  [address] [place] [address]      Phone: [phone]   carvedilol 3.125 MG tablet       Information about where to get these medications is not yet available    Ask your nurse or doctor about these medications  lisinopril 5 MG tablet             Spencer Awan MD  06/25/25  12:00 EDT

## 2025-06-30 ENCOUNTER — TELEPHONE (OUTPATIENT)
Dept: CARDIOLOGY | Age: 60
End: 2025-06-30
Payer: COMMERCIAL

## 2025-07-01 ENCOUNTER — HOSPITAL ENCOUNTER (OUTPATIENT)
Dept: CARDIOLOGY | Facility: HOSPITAL | Age: 60
Discharge: HOME | End: 2025-07-01
Admitting: INTERNAL MEDICINE
Payer: COMMERCIAL

## 2025-07-01 ENCOUNTER — RESULTS FOLLOW-UP (OUTPATIENT)
Dept: CARDIOLOGY | Facility: CLINIC | Age: 60
End: 2025-07-01
Payer: COMMERCIAL

## 2025-07-01 DIAGNOSIS — I25.10 CORONARY ARTERY DISEASE INVOLVING NATIVE CORONARY ARTERY OF NATIVE HEART WITHOUT ANGINA PECTORIS: ICD-10-CM

## 2025-07-01 LAB
BH CV STRESS BP STAGE 1: NORMAL
BH CV STRESS BP STAGE 2: NORMAL
BH CV STRESS DURATION MIN STAGE 1: 3
BH CV STRESS DURATION MIN STAGE 2: 3
BH CV STRESS DURATION MIN STAGE 3: 1
BH CV STRESS DURATION SEC STAGE 1: 0
BH CV STRESS DURATION SEC STAGE 2: 0
BH CV STRESS DURATION SEC STAGE 3: 0
BH CV STRESS GRADE STAGE 1: 10
BH CV STRESS GRADE STAGE 2: 12
BH CV STRESS GRADE STAGE 3: 14
BH CV STRESS HR STAGE 1: 102
BH CV STRESS HR STAGE 2: 125
BH CV STRESS HR STAGE 3: 140
BH CV STRESS METS STAGE 1: 5
BH CV STRESS METS STAGE 2: 7.5
BH CV STRESS METS STAGE 3: 8
BH CV STRESS PROTOCOL 1: NORMAL
BH CV STRESS RECOVERY BP: NORMAL MMHG
BH CV STRESS RECOVERY HR: 93 BPM
BH CV STRESS SPEED STAGE 1: 1.7
BH CV STRESS SPEED STAGE 2: 2.5
BH CV STRESS SPEED STAGE 3: 3.4
BH CV STRESS STAGE 1: 1
BH CV STRESS STAGE 2: 2
BH CV STRESS STAGE 3: 3
MAXIMAL PREDICTED HEART RATE: 161 BPM
PERCENT MAX PREDICTED HR: 86.96 %
STRESS BASELINE BP: NORMAL MMHG
STRESS BASELINE HR: 82 BPM
STRESS PERCENT HR: 102 %
STRESS POST ESTIMATED WORKLOAD: 8 METS
STRESS POST EXERCISE DUR MIN: 7 MIN
STRESS POST EXERCISE DUR SEC: 0 SEC
STRESS POST PEAK BP: NORMAL MMHG
STRESS POST PEAK HR: 140 BPM
STRESS TARGET HR: 137 BPM

## 2025-07-01 PROCEDURE — 93017 CV STRESS TEST TRACING ONLY: CPT

## 2025-07-01 NOTE — TELEPHONE ENCOUNTER
Please inform patient treadmill ECG stress test shows no evidence of tightly blocked coronary artery

## (undated) DEVICE — PK CATH CARD 40

## (undated) DEVICE — CATH VENT MIV RADL PIG ST TIP 4F 110CM

## (undated) DEVICE — CATH DIAG IMPULSE FL3.5 5F 100CM

## (undated) DEVICE — GLIDESHEATH BASIC HYDROPHILIC COATED INTRODUCER SHEATH: Brand: GLIDESHEATH

## (undated) DEVICE — GW EMR FIX EXCHG J STD .035 3MM 260CM

## (undated) DEVICE — KT MANIFLD CARDIAC